# Patient Record
Sex: FEMALE | Race: WHITE | Employment: OTHER | URBAN - METROPOLITAN AREA
[De-identification: names, ages, dates, MRNs, and addresses within clinical notes are randomized per-mention and may not be internally consistent; named-entity substitution may affect disease eponyms.]

---

## 2017-07-12 ENCOUNTER — OFFICE VISIT (OUTPATIENT)
Dept: PAIN MANAGEMENT | Age: 52
End: 2017-07-12

## 2017-07-12 VITALS
HEART RATE: 62 BPM | SYSTOLIC BLOOD PRESSURE: 151 MMHG | WEIGHT: 220 LBS | HEIGHT: 65 IN | BODY MASS INDEX: 36.65 KG/M2 | DIASTOLIC BLOOD PRESSURE: 80 MMHG

## 2017-07-12 DIAGNOSIS — M47.812 CERVICAL FACET SYNDROME: ICD-10-CM

## 2017-07-12 DIAGNOSIS — M51.36 LUMBAR DEGENERATIVE DISC DISEASE: ICD-10-CM

## 2017-07-12 DIAGNOSIS — G89.4 CHRONIC PAIN SYNDROME: ICD-10-CM

## 2017-07-12 DIAGNOSIS — M54.16 LUMBAR RADICULOPATHY: Primary | ICD-10-CM

## 2017-07-12 DIAGNOSIS — M47.816 SPONDYLOSIS OF LUMBAR REGION WITHOUT MYELOPATHY OR RADICULOPATHY: ICD-10-CM

## 2017-07-12 DIAGNOSIS — M54.2 CERVICALGIA: ICD-10-CM

## 2017-07-12 NOTE — PROGRESS NOTES
[facility]  Interventional Pain Management Consultation History & Physical    PATIENT NAME:  Praneeth Hanks     YOB: 1965    DATE OF SERVICE:   7/12/2017      CHIEF COMPLAINT:  Neck Pain and Back Pain (going down right leg)      REASON FOR VISIT:   Praneeth Hanks presents to the pain clinic today for initial evaluation and to consider interventional pain management options as indicated for the type and location of the pain the patient is presenting with. HISTORY OF PRESENT ILLNESS: Patient presents for initial evaluation and consideration for interventional procedures as indicated. She is referred through [facility] system for both neck pain as well as low back pain. Regarding her neck pain, she is diagnosis cervicalgia, diagnosis code M54.2. Regarding her low back pain she is diagnosed with radiculopathy lumbar region, diagnosis code M54.16. Patient endorses chronic neck pain of 2 years duration. She denies any injury accident or trauma. She endorses insidious onset to her neck pain. She endorses chronic neck pain of 2 years duration. Pain refers to both shoulders, right shoulder more than left shoulder. She refers to radiating right arm pain two thirds of the way down the right arm. She has difficulty grasping objects with her right hand. She endorses weakness of the right arm. She denies numbness or tingling of the right arm. She endorses chronic aching of her right arm. She takes Motrin for her pain, this initially was effective, but is no longer effective for she has not had physical therapy or other measures for her neck pain. Patient also endorses chronic low back pain of long-standing duration.   Patient endorses numbness and aching of her right side of her low back down down the right leg into the right calf and down into the right toes pain is increased with lumbar axial loading including prolonged sitting standing and walking. She is tried ibuprofen with little effect. She relates to having had previous low back injections in her low back, however not for a few years. She has not had previous low back surgery. She denies current use of blood thinners. She is tried physical therapy for the low back, however this is not worked. By additional review of available medical records, patient is known to have right lateral lower leg pain and numbness and tingling down into her right foot following the S1 dermatome. She is known to have lumbar spine degenerative joint disease with multilevel neuroforaminal narrowing and central canal stenosis. She has had previous injections in the past with relief. Chronic calf pain, right-sided, times years. Intermittent cramping right lower extremity with prolonged activity. Lumbar degenerative disc disease, hip osteoarthritis, hypertension. Lumbar spine MRI is reviewed, this is dated April 8, 2016. Lumbar degenerative disc disease is noted. L5-S1 degeneration is noted. Disc space narrowing at several levels. Moderate canal stenosis L3-4 mild bilateral neuroforaminal stenosis L3-4. L4-5 intervertebral disc narrowing causing mild to moderate canal stenosis, left neuroforaminal stenosis at L4-5 level. L5-S1 disc narrowing is noted. By review of available medical records, [facility] [place] medical records dating up to May 4, 2017 are reviewed. Patient has 3 year history of right lumbar radiculopathy with right leg tingling, pain, hip pain, L4-5 mild to moderate spinal and neuroforaminal stenosis. She is failed physical therapy to her low back. We are asked to evaluate for L4-5 lumbar epidural injection. Patient has been seen by a neurologist at [facility]. Her neurologist has noted that patient has right lower extremity symptoms.   Lumbar MRI demonstrates degenerative joint disease, neuroforaminal narrowing L4-5 with moderate spinal stenosis at this level. She further has had cervical spine MRI that shows mild degenerative joint disease and C6 spinal cord T2 lesion, nonenhancing related to spinal canal ectasia. No previous neck injuries are reported. Neurologist exam and evaluation done May 1, 2017. Neurologist evaluation is that patient has lumbar radiculopathy as well as cervicalgia. Right lumbar more than left lumbar L5 radiculopathy is noted. Likely related to lumbar spinal stenosis at L4-5 level. Best option being referral to pain management for epidural steroid injection. Neck pain is from neck degenerative joint disease and would benefit from facet injections or facet procedures. Spinal canal ectasia at C6 is asymptomatic. She was recommended to raise her gabapentin to 300 mg 4 times daily if no sedation. EMGs were ordered, I do not have these results at this time. ASSESSMENT/OPTIONS: as follows. We discussed options. Patient is essentially presenting with 2 pain complaints, her neck pain as well as her low back pain. Both of these pain areas are chronic in nature. First, with regard to her low back pain, she endorses low back pain and also right leg radiating pain. Her lumbar MRI demonstrates canal moderate stenosis at L4-5. She has neuroforaminal narrowing at several levels. She has degenerative disc disease. I believe she would benefit from interlaminar lumbar epidural steroid injection at L5-S1 with IV conscious sedation. We will plan a series of 2 of these. I have discussed the risks and benefits, indications, contraindications, and side effects of intended procedure with the patient. I have used skeleton spine model to describe and discuss the procedure with the patient. I have answered all questions relating to the procedure. Patient understands the nature of the procedure and wishes to proceed. Patient has no further questions.        Second, with regard to the patient's neck pain, her signs and symptoms, as well as exam and imaging evidence suggest cervical facet syndrome. Her cervical imaging demonstrates C6 spinal cord T2 lesion nonenhancing related to spinal canal ectasia. No canal or neuroforaminal narrowing or impingement is otherwise noted no canal or neuroforaminal stenoses are noted MRI of the cervical spine dated February 8, 2017. Patient has been seen by her neurologist who is recommended both intralaminar lumbar epidural steroid injections as well as bilateral cervical facet injections for cervical facet related pain. I am in agreement with both of these assessments. After the patient has had her interlaminar lumbar epidural steroid injections, we will then turn our attention to her cervical facet mediated pain due to cervical facet syndrome. I believe she will benefit from bilateral cervical facet injections at C5-6 and C6-7 levels. We will discuss these at a later visit. MRI Results (most recent):  No results found for this or any previous visit. PAST MEDICAL HISTORY:   The patient  has no past medical history on file. PAST SURGICAL HISTORY:   The patient  has no past surgical history on file. CURRENT MEDICATIONS:   The patient currently has no medications in their medication list.    ALLERGIES:   Not on File    FAMILY HISTORY:   The patient family history is not on file. SOCIAL HISTORY:   The patient  reports that she has never smoked. She has never used smokeless tobacco. The patient  has no alcohol history on file. She also  has no drug history on file.     REVIEW OF SYSTEMS:    The patient denies fever, chills, weight loss (Constitutional), rash, itching (Skin), tinnitus, congestion (HENT), blurred vision, photophobia (Eyes), palpitations, orthopnea (Cardiovascular), hemoptysis, wheezing (Respiratory), nausea, vomiting, diarrhea (Gastrointestinal), dysuria, hematuria, urgency (Genitourinary), bowel or bladder incontinence, loss of consciousness (Neurologic), suicidal or homicidal ideation or hallucinations (Psychiatric). Denies swelling, axillary or groin masses (Lymphatic). PHYSICAL EXAM:  VS:   Visit Vitals    /80    Pulse 62    Ht 5' 5\" (1.651 m)    Wt 99.8 kg (220 lb)    LMP 07/07/2017    BMI 36.61 kg/m2     General: Well-developed and well-nourished. Body habitus consistent with recorded height and weight and the calculated BMI. Apparent distress due to neck and low back pain. Head: Normocephalic, atraumatic. Skin: Inspection of the skin reveals no rashes, lesions or infection. CV: Regular rate. No murmurs or rubs noted. No peripheral edema noted. Pulm: Respirations are even and unlabored. Extr: No clubbing, cyanosis, or edema noted. Musculoskeletal:  1. Cervical spine -slightly decreased ROM. Paraspinous tenderness bilaterally . There is no scoliosis, asymmetry, or musculoskeletal defect. 2. Thoracic spine - Full ROM. No paraspinous tenderness at any level. There is no scoliosis, asymmetry, or musculoskeletal defect. 3. Lumbar spine -slightly decreased range of motion . Paraspinous tenderness. SI joints are nontender bilaterally. There is no scoliosis, asymmetry, or musculoskeletal defect. 4. Right upper extremity - Full ROM. 5/5 muscle strength in all muscle groups. No pain or tenderness in shoulder, elbow, wrist, or hand. 5. Left upper extremity - Full ROM. 5/5 muscle strength in all muscle groups. No pain or tenderness in shoulder, elbow, wrist, or hand. 6. Right lower extremity - Full ROM. 5/5 muscle strength in all muscle groups. No pain, tenderness, or swelling in the hip, knee, ankle or foot. 7. Left lower extremity - Full ROM. 5/5 muscle strength in all muscle groups. No pain, tenderness, or swelling in the hip, knee, ankle or foot. Neurological:  1. Mental Status - Alert, awake and oriented. Speech is clear and appropriate. 2. Cranial Nerves - Extraocular muscles intact bilaterally. Cranial nerves II-XII grossly intact bilaterally. 3. Gait - antalgic   4. Reflexes - 2+ and symmetric throughout. 5. Sensation - Intact to light touch and pin prick. 6. Provocative Tests - Spurlings negative bilaterally. Straight leg raise negative bilaterally. Psychological:  1. Mood and affect - Appropriate. 2. Speech - Appropriate. 3. Though content - Appropriate. 4. Judgment - Appropriate. ASSESSMENT:      ICD-10-CM ICD-9-CM    1. Lumbar radiculopathy M54.16 724.4    2. Lumbar degenerative disc disease M51.36 722.52    3. Spondylosis of lumbar region without myelopathy or radiculopathy M47.816 721.3    4. Chronic pain syndrome G89.4 338.4    5. Cervicalgia M54.2 723.1    6. Cervical facet syndrome M12.88 723.8            PLAN:    1.    I have thoroughly discussed the risks and benefits, indications, contraindications, and side effects of any and procedures that were mentioned at today's patient visit. I have used a skeleton model to explain all procedures, as well as to provide added emphasis regarding procedures and as well for patient education purposes. I have answered all questions in great detail, and I have obtained verbal confirmation for all procedures planned with the patient. 3.    I have reviewed in great detail today the patient's MRI and other imaging studies with the patient. I have explained to the patient their condition using both actual recent and relevant images insofar as I am able to obtain actual images. I have used a skeleton model for added emphasis as well as patient education. 4.    I have advised patient to have a primary care provider continue to care for their health maintenance and general medical conditions. 5,    I have placed appropriate referrals to specialty care providers as I have deemed necessary through today's clinical consultation with the patient.   5.    I have explained to the patient that if any significant side effects, issues, problems, concerns, or perceived complications may have arisen at around the time of the patient's procedures, they should either call the pain management clinic or go to the emergency room immediately for medical provider evaluation. 6.   I have encouraged all patients to call the pain management clinic with any questions or concerns that they may have pertaining to their procedures. DISPOSITION:   The patients condition and plan were discussed at length and all questions were answered. The patient agrees with the plan. A total of 45 minutes was spent with the patient of which over half of the time was spent counseling the patient. Iraida Garcia MD 7/12/2017 2:30 PM    Note: Although these clinic notes were documented by the provider at the time of the exam, they have not been proofed and are subject to transcription variance.

## 2017-07-14 RX ORDER — MIDAZOLAM HYDROCHLORIDE 1 MG/ML
.5-6 INJECTION, SOLUTION INTRAMUSCULAR; INTRAVENOUS
Status: CANCELLED | OUTPATIENT
Start: 2017-07-19

## 2017-07-14 RX ORDER — SODIUM CHLORIDE 0.9 % (FLUSH) 0.9 %
5-10 SYRINGE (ML) INJECTION AS NEEDED
Status: CANCELLED | OUTPATIENT
Start: 2017-07-14

## 2017-07-19 ENCOUNTER — APPOINTMENT (OUTPATIENT)
Dept: GENERAL RADIOLOGY | Age: 52
End: 2017-07-19
Attending: PHYSICAL MEDICINE & REHABILITATION
Payer: OTHER GOVERNMENT

## 2017-07-19 ENCOUNTER — HOSPITAL ENCOUNTER (OUTPATIENT)
Age: 52
Setting detail: OUTPATIENT SURGERY
Discharge: HOME | End: 2017-07-19
Attending: PHYSICAL MEDICINE & REHABILITATION | Admitting: PHYSICAL MEDICINE & REHABILITATION
Payer: OTHER GOVERNMENT

## 2017-07-19 VITALS
DIASTOLIC BLOOD PRESSURE: 75 MMHG | SYSTOLIC BLOOD PRESSURE: 116 MMHG | RESPIRATION RATE: 15 BRPM | BODY MASS INDEX: 36.65 KG/M2 | TEMPERATURE: 98.5 F | HEART RATE: 65 BPM | WEIGHT: 220 LBS | HEIGHT: 65 IN | OXYGEN SATURATION: 94 %

## 2017-07-19 LAB — HCG UR QL: NEGATIVE

## 2017-07-19 PROCEDURE — 74011636320 HC RX REV CODE- 636/320

## 2017-07-19 PROCEDURE — 81025 URINE PREGNANCY TEST: CPT

## 2017-07-19 PROCEDURE — 76010000009 HC PAIN MGT 0 TO 30 MIN PROC: Performed by: PHYSICAL MEDICINE & REHABILITATION

## 2017-07-19 PROCEDURE — 74011250636 HC RX REV CODE- 250/636

## 2017-07-19 PROCEDURE — 74011000250 HC RX REV CODE- 250

## 2017-07-19 RX ORDER — ZOLMITRIPTAN 5 MG/1
5 TABLET, FILM COATED ORAL
COMMUNITY

## 2017-07-19 RX ORDER — SODIUM CHLORIDE 0.9 % (FLUSH) 0.9 %
5-10 SYRINGE (ML) INJECTION AS NEEDED
Status: DISCONTINUED | OUTPATIENT
Start: 2017-07-19 | End: 2017-07-19 | Stop reason: HOSPADM

## 2017-07-19 RX ORDER — IBUPROFEN 200 MG
800 TABLET ORAL AS NEEDED
COMMUNITY
End: 2022-04-22 | Stop reason: CLARIF

## 2017-07-19 RX ORDER — LOSARTAN POTASSIUM AND HYDROCHLOROTHIAZIDE 25; 100 MG/1; MG/1
1 TABLET ORAL DAILY
COMMUNITY
End: 2019-09-09 | Stop reason: ALTCHOICE

## 2017-07-19 RX ORDER — BUTALBITAL, ASPIRIN, AND CAFFEINE 325; 50; 40 MG/1; MG/1; MG/1
1 CAPSULE ORAL
COMMUNITY
End: 2022-04-22 | Stop reason: CLARIF

## 2017-07-19 RX ORDER — MIDAZOLAM HYDROCHLORIDE 1 MG/ML
.5-6 INJECTION, SOLUTION INTRAMUSCULAR; INTRAVENOUS
Status: DISCONTINUED | OUTPATIENT
Start: 2017-07-19 | End: 2017-07-19 | Stop reason: HOSPADM

## 2017-07-19 RX ORDER — SIMVASTATIN 20 MG/1
20 TABLET, FILM COATED ORAL
COMMUNITY

## 2017-07-19 RX ORDER — LIDOCAINE HYDROCHLORIDE 10 MG/ML
INJECTION, SOLUTION EPIDURAL; INFILTRATION; INTRACAUDAL; PERINEURAL AS NEEDED
Status: DISCONTINUED | OUTPATIENT
Start: 2017-07-19 | End: 2017-07-19 | Stop reason: HOSPADM

## 2017-07-19 RX ORDER — BETAMETHASONE SODIUM PHOSPHATE AND BETAMETHASONE ACETATE 3; 3 MG/ML; MG/ML
INJECTION, SUSPENSION INTRA-ARTICULAR; INTRALESIONAL; INTRAMUSCULAR; SOFT TISSUE AS NEEDED
Status: DISCONTINUED | OUTPATIENT
Start: 2017-07-19 | End: 2017-07-19 | Stop reason: HOSPADM

## 2017-07-19 RX ORDER — ZOLPIDEM TARTRATE 5 MG/1
10 TABLET ORAL
COMMUNITY

## 2017-07-19 RX ORDER — PROPRANOLOL HYDROCHLORIDE 120 MG/1
240 CAPSULE, EXTENDED RELEASE ORAL DAILY
COMMUNITY

## 2017-07-19 RX ORDER — GABAPENTIN 300 MG/1
300 CAPSULE ORAL 4 TIMES DAILY
COMMUNITY

## 2017-07-19 RX ORDER — FENTANYL CITRATE 50 UG/ML
INJECTION, SOLUTION INTRAMUSCULAR; INTRAVENOUS AS NEEDED
Status: DISCONTINUED | OUTPATIENT
Start: 2017-07-19 | End: 2017-07-19 | Stop reason: HOSPADM

## 2017-07-19 NOTE — DISCHARGE INSTRUCTIONS
[address] [place] [address] for Pain Management      Post Procedures Instructions    *Resume Diet and Activity as tolerated. Rest for the remainder of the day. *You may fell worse before you feel better as the numbing medications wear off before the steroids take effect if used for your procedures. *Do not use affected extremity until numbness or loss of sensation has completely resolved without assistance. *DO NOT DRIVE, operate machinery/heavey equipment for 24 hours. *DO NOT DRINK ALCOHOL for 24 hours as it may interact with the sedation if you received it and also thins your blood and may cause you to bleed. *WAIT 24 hours before starting back ANY Blood thinning medications:   (Heparin, Coumadin, Warfarin, Lovenox, Plavix, Aggrenox)    *Resume Pre-Procedure Medications as prescribed except Blood Thinners unless directed by your Physician or Cardiologist.     *Avoid Hot tubs and Heating pad for 24 hours to prevent dissipation of medications, you may shower to remove bandages and remaining prep residue on the skin. * If you develop a Headache, drink plenty of fluids including beverages with caffeine (Coffee, Mt. Dew etc.) and rest.  If the headache persists longer than 24 hoursor intensifies - Please call Center for Pain Management ([facility]) [phone]    * If you are DIABETIC, check your blood sugar three times a day for the next three days, the steroids will increase your blood sugar. If your blood sugar is greater than 400 have someone drive you to the nearest [address]. * If you experience any of the following problems, call the Center for Pain Management [phone] between 8:00 am - 4:30pm or After Hours [phone].     Shortness of breath    Fever of 101 F or higher    Nausea / Vomiting (not normal to you)    Increasing stiffness in the neck    Weakness or numbness in the arms or legs that is not resolving    Prolonged and increasing pain > than 4 days    ANYTHING OUT of the ORDINARY TO YOU    If YOU are experiencing a severe reaction / complication that you have never had before post procedure, call 911 or go to the nearest emergency room! All patients must have a  for transportation [place] regardless if you do or do not receive sedation. DISCHARGE SUMMARY from Nurse      PATIENT INSTRUCTIONS:    After Oral  or intravenous sedation, for 24 hours or while taking prescription Narcotics:  · Limit your activities  · Do not drive and operate hazardous machinery  · Do not make important personal or business decisions  · Do  not drink alcoholic beverages  · If you have not urinated within 8 hours after discharge, please contact your surgeon on call. Report the following to your surgeon:  · Excessive pain, swelling, redness or odor of or around the surgical area  · Temperature over 101  · Nausea and vomiting lasting longer than 4 hours or if unable to take medications  · Any signs of decreased circulation or nerve impairment to extremity: change in color, persistent  numbness, tingling, coldness or increase pain  · Any questions        What to do at Home:  Recommended activity: Activity as tolerated, NO DRIVING FOR 24 Hours post injection          *  Please give a list of your current medications to your Primary Care Provider. *  Please update this list whenever your medications are discontinued, doses are      changed, or new medications (including over-the-counter products) are added. *  Please carry medication information at all times in case of emergency situations. These are general instructions for a healthy lifestyle:    No smoking/ No tobacco products/ Avoid exposure to second hand smoke    Surgeon General's Warning:  Quitting smoking now greatly reduces serious risk to your health.     Obesity, smoking, and sedentary lifestyle greatly increases your risk for illness    A healthy diet, regular physical exercise & weight monitoring are important for maintaining a healthy lifestyle    You may be retaining fluid if you have a history of heart failure or if you experience any of the following symptoms:  Weight gain of 3 pounds or more overnight or 5 pounds in a week, increased swelling in our hands or feet or shortness of breath while lying flat in bed. Please call your doctor as soon as you notice any of these symptoms; do not wait until your next office visit. Recognize signs and symptoms of STROKE:    F-face looks uneven    A-arms unable to move or move unevenly    S-speech slurred or non-existent    T-time-call 911 as soon as signs and symptoms begin-DO NOT go       Back to bed or wait to see if you get better-TIME IS BRAIN.

## 2017-07-19 NOTE — INTERVAL H&P NOTE
H&P Update:  Araceli Torres was seen and examined. History and physical has been reviewed. The patient has been examined.  There have been no significant clinical changes since the completion of the originally dated History and Physical.    Signed By: Jameel Myles MD     July 19, 2017 8:32 AM

## 2017-07-19 NOTE — PROCEDURES
[facility]    LUMBAR EPIDURAL STEROID INJECTION  PROCEDURE REPORT      PATIENT:  Cyndi Sherman  YOB: 1965  DATE OF SERVICE:  7/19/2017  [facility]    PRE-PROCEDURE DIAGNOSIS:  See Above    POST-PROCEDURE DIAGNOSIS:  See Above                PROCEDURE:    1. Lumbar Interlaminar epidural steroid injection, L5-S1 (91934)  2. Fluoroscopic needle guidance (spinal) (46266)        3. Supervision of moderate sedation (85070)    ANESTHESIA:  Local with moderate IV sedation. See Medication Administration Record for specific medications and dosage. COMPLICATIONS: None. PHYSICIAN:  Saul Caal MD    PRE-PROCEDURE NOTE:  Pre-procedural assessment of the patient was performed including a limited history and physical examination. The details of the procedure were discussed with the patient, including the risks, benefits and alternative options and an informed consent was obtained. The patients NPO status, if necessary for the specific procedure and/or administration of moderate intravenous sedation, if utilized, and availability of a responsible adult to escort the patient following the procedure were confirmed. PROCEDURE NOTE:  The patient was brought to the procedure suite and positioned on the fluoroscopy table in the prone position. Physiologic monitors were applied and supplemental oxygen was administered via nasal cannula. The skin was prepped in the standard surgical fashion and sterile drapes were applied over the procedure site. Please refer to the Flowsheet for documentation of the patients vital signs and the Medication Administration Record for any oral and/or intravenous sedation administered prior to or during the procedure. The above-listed interlaminar space was identified and the skin and subcutaneous tissues were infiltrated with 1% Lidocaine.   Under anterior-posterior fluoroscopic guidance an 18g, 3.5-inch Tuohy epidural needle was advanced along the previously identified interlaminar space. The fluoroscope was then turned lateral view and a loss of resistance syringe was attached to the needle containing preservative free normal saline. Under lateral flouroscopic guidance, the needle was then advanced through the ligamentum flavum, entering the epidural space with a clear and crisp loss of resistance. The needle was not advanced beyond the interlaminar line at any time during this process. Aspiration was negative for blood or CSF. Additional confirmation was made with the injection of 1 mL of a nonionic water-soluble radiographic contrast medium (Isovue-M 200). Following this, 4 mL of a solution comprised of 2 mL of lidocaine 1% and 2mL betamethasone (6mg/ml) was injected slowly through the epidural needle. The needle was cleared of steroid solution and removed. The area was thoroughly cleaned and sterile bandages applied as necessary. The patient tolerated the procedure well and vital signs remained stable throughout the procedure. POST-PROCEDURE COURSE:   The patient was escorted from the procedure suite in satisfactory condition and recovered per facility protocol based on the type of procedure performed and/or the sedation utilized. The patient did not experience any adverse events and remained hemodynamically stable during the post-procedure period. DISCHARGE NOTE:  Upon discharge, the patient was able to tolerate fluids and was in no acute distress. The patient was oriented to person, place and time and vital signs were stable. Appropriate post-procedure instructions were provided and explained to the patient in detail and all questions were answered.     Mikaela Hendrix MD 7/19/2017 10:52 AM

## 2017-07-28 ENCOUNTER — HOSPITAL ENCOUNTER (OUTPATIENT)
Dept: LAB | Age: 52
Discharge: HOME | End: 2017-07-28
Payer: OTHER GOVERNMENT

## 2017-07-28 DIAGNOSIS — Z01.818 PRE-OP EVALUATION: ICD-10-CM

## 2017-07-28 LAB
ANION GAP BLD CALC-SCNC: 7 MMOL/L (ref 3–18)
ATRIAL RATE: 64 BPM
BUN SERPL-MCNC: 13 MG/DL (ref 7–18)
BUN/CREAT SERPL: 15 (ref 12–20)
CALCIUM SERPL-MCNC: 9 MG/DL (ref 8.5–10.1)
CALCULATED T AXIS, ECG11: 24 DEGREES
CHLORIDE SERPL-SCNC: 103 MMOL/L (ref 100–108)
CO2 SERPL-SCNC: 28 MMOL/L (ref 21–32)
CREAT SERPL-MCNC: 0.87 MG/DL (ref 0.6–1.3)
DIAGNOSIS, 93000: NORMAL
GLUCOSE SERPL-MCNC: 84 MG/DL (ref 74–99)
P-R INTERVAL, ECG05: 168 MS
POTASSIUM SERPL-SCNC: 4.1 MMOL/L (ref 3.5–5.5)
Q-T INTERVAL, ECG07: 410 MS
QRS DURATION, ECG06: 80 MS
QTC CALCULATION (BEZET), ECG08: 422 MS
SODIUM SERPL-SCNC: 138 MMOL/L (ref 136–145)
VENTRICULAR RATE, ECG03: 64 BPM

## 2017-07-28 PROCEDURE — 93005 ELECTROCARDIOGRAM TRACING: CPT

## 2017-07-28 RX ORDER — SODIUM CHLORIDE 0.9 % (FLUSH) 0.9 %
5-10 SYRINGE (ML) INJECTION AS NEEDED
Status: CANCELLED | OUTPATIENT
Start: 2017-08-02

## 2017-07-28 RX ORDER — MIDAZOLAM HYDROCHLORIDE 1 MG/ML
.5-6 INJECTION, SOLUTION INTRAMUSCULAR; INTRAVENOUS
Status: CANCELLED | OUTPATIENT
Start: 2017-08-02

## 2017-08-02 ENCOUNTER — HOSPITAL ENCOUNTER (OUTPATIENT)
Age: 52
Setting detail: OUTPATIENT SURGERY
Discharge: HOME | End: 2017-08-02
Attending: PHYSICAL MEDICINE & REHABILITATION | Admitting: PHYSICAL MEDICINE & REHABILITATION
Payer: OTHER GOVERNMENT

## 2017-08-02 ENCOUNTER — APPOINTMENT (OUTPATIENT)
Dept: GENERAL RADIOLOGY | Age: 52
End: 2017-08-02
Attending: PHYSICAL MEDICINE & REHABILITATION
Payer: OTHER GOVERNMENT

## 2017-08-02 VITALS
HEIGHT: 65 IN | BODY MASS INDEX: 36.65 KG/M2 | TEMPERATURE: 98.5 F | OXYGEN SATURATION: 92 % | DIASTOLIC BLOOD PRESSURE: 68 MMHG | SYSTOLIC BLOOD PRESSURE: 101 MMHG | WEIGHT: 220 LBS | HEART RATE: 60 BPM | RESPIRATION RATE: 15 BRPM

## 2017-08-02 PROCEDURE — 76010000009 HC PAIN MGT 0 TO 30 MIN PROC: Performed by: PHYSICAL MEDICINE & REHABILITATION

## 2017-08-02 PROCEDURE — 74011636320 HC RX REV CODE- 636/320

## 2017-08-02 PROCEDURE — 74011000250 HC RX REV CODE- 250

## 2017-08-02 PROCEDURE — 74011250636 HC RX REV CODE- 250/636

## 2017-08-02 RX ORDER — SODIUM CHLORIDE 0.9 % (FLUSH) 0.9 %
5-10 SYRINGE (ML) INJECTION AS NEEDED
Status: DISCONTINUED | OUTPATIENT
Start: 2017-08-02 | End: 2017-08-02 | Stop reason: HOSPADM

## 2017-08-02 RX ORDER — FENTANYL CITRATE 50 UG/ML
25-400 INJECTION, SOLUTION INTRAMUSCULAR; INTRAVENOUS
Status: DISCONTINUED | OUTPATIENT
Start: 2017-08-02 | End: 2017-08-02 | Stop reason: HOSPADM

## 2017-08-02 RX ORDER — LIDOCAINE HYDROCHLORIDE 10 MG/ML
INJECTION, SOLUTION EPIDURAL; INFILTRATION; INTRACAUDAL; PERINEURAL AS NEEDED
Status: DISCONTINUED | OUTPATIENT
Start: 2017-08-02 | End: 2017-08-02 | Stop reason: HOSPADM

## 2017-08-02 RX ORDER — MIDAZOLAM HYDROCHLORIDE 1 MG/ML
.5-6 INJECTION, SOLUTION INTRAMUSCULAR; INTRAVENOUS
Status: DISCONTINUED | OUTPATIENT
Start: 2017-08-02 | End: 2017-08-02 | Stop reason: HOSPADM

## 2017-08-02 RX ORDER — BETAMETHASONE SODIUM PHOSPHATE AND BETAMETHASONE ACETATE 3; 3 MG/ML; MG/ML
INJECTION, SUSPENSION INTRA-ARTICULAR; INTRALESIONAL; INTRAMUSCULAR; SOFT TISSUE AS NEEDED
Status: DISCONTINUED | OUTPATIENT
Start: 2017-08-02 | End: 2017-08-02 | Stop reason: HOSPADM

## 2017-08-02 NOTE — DISCHARGE INSTRUCTIONS
[facility]      Post Procedures Instructions    *Resume Diet and Activity as tolerated. Rest for the remainder of the day. *You may fell worse before you feel better as the numbing medications wear off before the steroids take effect if used for your procedures. *Do not use affected extremity until numbness or loss of sensation has completely resolved without assistance. *DO NOT DRIVE, operate machinery/heavey equipment for 24 hours. *DO NOT DRINK ALCOHOL for 24 hours as it may interact with the sedation if you received it and also thins your blood and may cause you to bleed. *WAIT 24 hours before starting back ANY Blood thinning medications:   (Heparin, Coumadin, Warfarin, Lovenox, Plavix, Aggrenox)    *Resume Pre-Procedure Medications as prescribed except Blood Thinners unless directed by your Physician or Cardiologist.     *Avoid Hot tubs and Heating pad for 24 hours to prevent dissipation of medications, you may shower to remove bandages and remaining prep residue on the skin. * If you develop a Headache, drink plenty of fluids including beverages with caffeine (Coffee, Mt. Dew etc.) and rest.  If the headache persists longer than 24 hoursor intensifies - Please call [facility] ([facility]) [phone]    * If you are DIABETIC, check your blood sugar three times a day for the next three days, the steroids will increase your blood sugar. If your blood sugar is greater than 400 have someone drive you to the nearest [address]. * If you experience any of the following problems, call the [facility] [phone] between 8:00 am - 4:30pm or After Hours [phone].     Shortness of breath    Fever of 101 F or higher    Nausea / Vomiting (not normal to you)    Increasing stiffness in the neck    Weakness or numbness in the arms or legs that is not resolving    Prolonged and increasing pain > than 4 days    ANYTHING OUT of the ORDINARY TO YOU    If YOU are experiencing a severe reaction / complication that you have never had before post procedure, call 911 or go to the nearest emergency room! All patients must have a  for transportation [place] regardless if you do or do not receive sedation. DISCHARGE SUMMARY from Nurse      PATIENT INSTRUCTIONS:    After Oral  or intravenous sedation, for 24 hours or while taking prescription Narcotics:  · Limit your activities  · Do not drive and operate hazardous machinery  · Do not make important personal or business decisions  · Do  not drink alcoholic beverages  · If you have not urinated within 8 hours after discharge, please contact your surgeon on call. Report the following to your surgeon:  · Excessive pain, swelling, redness or odor of or around the surgical area  · Temperature over 101  · Nausea and vomiting lasting longer than 4 hours or if unable to take medications  · Any signs of decreased circulation or nerve impairment to extremity: change in color, persistent  numbness, tingling, coldness or increase pain  · Any questions        What to do at Home:  Recommended activity: Activity as tolerated, NO DRIVING FOR 24 Hours post injection          *  Please give a list of your current medications to your Primary Care Provider. *  Please update this list whenever your medications are discontinued, doses are      changed, or new medications (including over-the-counter products) are added. *  Please carry medication information at all times in case of emergency situations. These are general instructions for a healthy lifestyle:    No smoking/ No tobacco products/ Avoid exposure to second hand smoke    Surgeon General's Warning:  Quitting smoking now greatly reduces serious risk to your health.     Obesity, smoking, and sedentary lifestyle greatly increases your risk for illness    A healthy diet, regular physical exercise & weight monitoring are important for maintaining a healthy lifestyle    You may be retaining fluid if you have a history of heart failure or if you experience any of the following symptoms:  Weight gain of 3 pounds or more overnight or 5 pounds in a week, increased swelling in our hands or feet or shortness of breath while lying flat in bed. Please call your doctor as soon as you notice any of these symptoms; do not wait until your next office visit. Recognize signs and symptoms of STROKE:    F-face looks uneven    A-arms unable to move or move unevenly    S-speech slurred or non-existent    T-time-call 911 as soon as signs and symptoms begin-DO NOT go       Back to bed or wait to see if you get better-TIME IS BRAIN.

## 2017-08-02 NOTE — INTERVAL H&P NOTE
H&P Update:  Maxim Strange was seen and examined. History and physical has been reviewed. The patient has been examined.  There have been no significant clinical changes since the completion of the originally dated History and Physical.    Signed By: Brody Lombardo MD     August 2, 2017 7:57 AM

## 2017-08-02 NOTE — PROCEDURES
[facility]    LUMBAR EPIDURAL STEROID INJECTION  PROCEDURE REPORT      PATIENT:  Jalil Ureña  YOB: 1965  DATE OF SERVICE:  8/2/2017  SITE:  [facility]    PRE-PROCEDURE DIAGNOSIS:  See Above    POST-PROCEDURE DIAGNOSIS:  See Above                PROCEDURE:    1. Lumbar Interlaminar epidural steroid injection, L5-S1 (00592)  2. Fluoroscopic needle guidance (spinal) (79752)        3. Supervision of moderate sedation (21155)    ANESTHESIA:  Local with moderate IV sedation. See Medication Administration Record for specific medications and dosage. COMPLICATIONS: None. PHYSICIAN:  Wanda Torres MD    PRE-PROCEDURE NOTE:  Pre-procedural assessment of the patient was performed including a limited history and physical examination. The details of the procedure were discussed with the patient, including the risks, benefits and alternative options and an informed consent was obtained. The patients NPO status, if necessary for the specific procedure and/or administration of moderate intravenous sedation, if utilized, and availability of a responsible adult to escort the patient following the procedure were confirmed. PROCEDURE NOTE:  The patient was brought to the procedure suite and positioned on the fluoroscopy table in the prone position. Physiologic monitors were applied and supplemental oxygen was administered via nasal cannula. The skin was prepped in the standard surgical fashion and sterile drapes were applied over the procedure site. Please refer to the Flowsheet for documentation of the patients vital signs and the Medication Administration Record for any oral and/or intravenous sedation administered prior to or during the procedure. The above-listed interlaminar space was identified and the skin and subcutaneous tissues were infiltrated with 1% Lidocaine.   Under anterior-posterior fluoroscopic guidance an 18g, 3.5-inch Tuohy epidural needle was advanced along the previously identified interlaminar space. The fluoroscope was then turned lateral view and a loss of resistance syringe was attached to the needle containing preservative free normal saline. Under lateral flouroscopic guidance, the needle was then advanced through the ligamentum flavum, entering the epidural space with a clear and crisp loss of resistance. The needle was not advanced beyond the interlaminar line at any time during this process. Aspiration was negative for blood or CSF. Additional confirmation was made with the injection of 1 mL of a nonionic water-soluble radiographic contrast medium (Isovue-M 200). Following this, 4 mL of a solution comprised of 2 mL of lidocaine 1% and 2mL betamethasone (6mg/ml) was injected slowly through the epidural needle. The needle was cleared of steroid solution and removed. The area was thoroughly cleaned and sterile bandages applied as necessary. The patient tolerated the procedure well and vital signs remained stable throughout the procedure. POST-PROCEDURE COURSE:   The patient was escorted from the procedure suite in satisfactory condition and recovered per facility protocol based on the type of procedure performed and/or the sedation utilized. The patient did not experience any adverse events and remained hemodynamically stable during the post-procedure period. DISCHARGE NOTE:  Upon discharge, the patient was able to tolerate fluids and was in no acute distress. The patient was oriented to person, place and time and vital signs were stable. Appropriate post-procedure instructions were provided and explained to the patient in detail and all questions were answered.     Bryce Rosales MD 8/2/2017 8:27 AM

## 2019-09-09 ENCOUNTER — OFFICE VISIT (OUTPATIENT)
Dept: ORTHOPEDIC SURGERY | Age: 54
End: 2019-09-09

## 2019-09-09 VITALS
BODY MASS INDEX: 37.49 KG/M2 | WEIGHT: 225 LBS | SYSTOLIC BLOOD PRESSURE: 150 MMHG | HEIGHT: 65 IN | DIASTOLIC BLOOD PRESSURE: 87 MMHG | HEART RATE: 59 BPM

## 2019-09-09 DIAGNOSIS — M51.36 BULGE OF LUMBAR DISC WITHOUT MYELOPATHY: ICD-10-CM

## 2019-09-09 DIAGNOSIS — M79.18 CHRONIC MUSCULOSKELETAL PAIN: ICD-10-CM

## 2019-09-09 DIAGNOSIS — M54.16 LUMBAR RADICULOPATHY: Primary | ICD-10-CM

## 2019-09-09 DIAGNOSIS — R93.7 ABNORMAL MRI, CERVICAL SPINE: ICD-10-CM

## 2019-09-09 DIAGNOSIS — M62.838 MUSCLE SPASM: ICD-10-CM

## 2019-09-09 DIAGNOSIS — E66.01 SEVERE OBESITY (HCC): ICD-10-CM

## 2019-09-09 DIAGNOSIS — G89.29 CHRONIC MUSCULOSKELETAL PAIN: ICD-10-CM

## 2019-09-09 DIAGNOSIS — M47.816 LUMBAR FACET ARTHROPATHY: ICD-10-CM

## 2019-09-09 RX ORDER — HYDROCHLOROTHIAZIDE 50 MG/1
50 TABLET ORAL DAILY
COMMUNITY

## 2019-09-09 RX ORDER — DICLOFENAC SODIUM 10 MG/G
2 GEL TOPICAL 4 TIMES DAILY
COMMUNITY

## 2019-09-09 RX ORDER — AMLODIPINE BESYLATE 5 MG/1
5 TABLET ORAL DAILY
COMMUNITY

## 2019-09-09 RX ORDER — DULOXETIN HYDROCHLORIDE 30 MG/1
CAPSULE, DELAYED RELEASE ORAL
Qty: 60 CAP | Refills: 1 | Status: SHIPPED | OUTPATIENT
Start: 2019-09-09 | End: 2022-04-22 | Stop reason: CLARIF

## 2019-09-09 RX ORDER — LOSARTAN POTASSIUM 100 MG/1
100 TABLET ORAL DAILY
COMMUNITY
End: 2022-04-22 | Stop reason: CLARIF

## 2019-09-09 RX ORDER — FLUTICASONE PROPIONATE 50 MCG
2 SPRAY, SUSPENSION (ML) NASAL DAILY
COMMUNITY

## 2019-09-09 NOTE — PROGRESS NOTES
[facility]  [address] [place], [facility] [address]  Phone: [phone]  Fax: [phone]    NEW PATIENT  Pt's YOB: 1965    ASSESSMENT   Diagnoses and all orders for this visit:    1. Lumbar radiculopathy    2. Bulge of lumbar disc without myelopathy    3. Chronic musculoskeletal pain  -     DULoxetine (CYMBALTA) 30 mg capsule; Take 1 cap by mouth in the evening with dinner for 1 week, then increase to 2 as directed. 4. Abnormal MRI, cervical spine    5. Lumbar facet arthropathy    6. Muscle spasm    7. Severe obesity ([address]         IMPRESSION AND PLAN:  Reji Mendoza is a 62 y.o. right hand dominant female with history of neck and right leg pain. She complains of pain in the neck and aching, throbbing, burning pain radiating down the right leg to the toes x 8-10 years without inciting injuries. Pt last attended physical therapy over 1 year ago and had to discontinue sessions due to severe pain with lumbar traction. She uses ibuprofen, Voltaren 1% gel, and Neurontin 300 mg 1 tab up to 4 x daily as needed with minimal relief. 1) Pt was given information on cervical and lumbar arthritis exercises. 2) Discussed treatment options with the patient including medication, physical therapy with dry needling, and steroid injections. 3) She will taper off the Neurontin 300 mg as tolerated. Discussed restarting that Neurontin and increase to 300 mg 2 tabs TID if needed. 4) She was prescribed Cymbalta 30 mg 1-2 tabs in the evening with daily with dinner as directed. 5) I encouraged the patient to exercise regularly, 30 minutes a day, 5 days a week. 6) She will bring her cervical and lumbar MRI discs to her next office visit. 7) Ms. Ernie Urias has a reminder for a \"due or due soon\" health maintenance. I have asked that she contact her primary care provider, Other, MD Mary, for follow-up on this health maintenance.   8)  demonstrated consistency with prescribing. 9) Weight loss recommended. 10) Repeat cervical MRI - report recommends in 1 year (June 2020)  Follow-up and Dispositions    · Return in about 6 weeks (around 10/21/2019) for Medication follow up, Diagnostic Test follow up. HISTORY OF PRESENT ILLNESS:  Akbar Mejias is a 62 y.o. right hand dominant female with history of neck and right leg pain x 8-10 years. Pt presents to the office today as a new patient. She complains of pain in the neck and aching, throbbing, burning pain radiating down the right leg to the toes x 8-10 years without inciting injuries. Pt admits to burning pain and numbness in the toes on the right. She notes increased right leg pain when sitting, standing, walking, or performing any activity for longer than 20-30 minutes. Pt denies any left-sided symptoms or lower back pain at this time. She states that her symptoms started in the right calf and have gradually progressed. Pt denies any loss of bowel/bladder control or issues with balance. She previously had an EMG about 1 year ago at [facility] and according to the patient, this demonstrated normal findings. Pt last attended physical therapy over 1 year ago and had to discontinue sessions due to severe pain with lumbar traction. She denies any previous cervical traction or dry needling. Pt reports no relief with previous hip and lumbar injections. She uses ibuprofen and Voltaren 1% gel with minimal improvement. Pt also takes Neurontin 300 mg 1 tab up to 4 x daily as needed without relief. She reports minimal relief with Topamax and Lyrica in the past. Pt admits to a history of migraine headaches and is on Zomig. She took Wellbutrin in the past but denies ever taking Cymbalta. Pt notes that she performs stretches as needed but reports a inconsistent HEP. Pt at this time desires to proceed with medication evaluation.     Pain Scale: /10     PCP: Other, MD Mary    Past Medical History: Diagnosis Date    Hypertension     Sleep apnea         Social History     Socioeconomic History    Marital status:      Spouse name: Not on file    Number of children: Not on file    Years of education: Not on file    Highest education level: Not on file   Occupational History    Not on file   Social Needs    Financial resource strain: Not on file    Food insecurity:     Worry: Not on file     Inability: Not on file    Transportation needs:     Medical: Not on file     Non-medical: Not on file   Tobacco Use    Smoking status: Never Smoker    Smokeless tobacco: Never Used   Substance and Sexual Activity    Alcohol use: Not Currently    Drug use: Not on file    Sexual activity: Not on file   Lifestyle    Physical activity:     Days per week: Not on file     Minutes per session: Not on file    Stress: Not on file   Relationships    Social connections:     Talks on phone: Not on file     Gets together: Not on file     Attends Denominational service: Not on file     Active member of club or organization: Not on file     Attends meetings of clubs or organizations: Not on file     Relationship status: Not on file    Intimate partner violence:     Fear of current or ex partner: Not on file     Emotionally abused: Not on file     Physically abused: Not on file     Forced sexual activity: Not on file   Other Topics Concern     Service Not Asked    Blood Transfusions Not Asked    Caffeine Concern Not Asked    Occupational Exposure Not Asked   [address] Hazards Not Asked    Sleep Concern Not Asked    Stress Concern Not Asked    Weight Concern Not Asked    Special Diet Not Asked    Back Care Not Asked    Exercise Not Asked    Bike Helmet Not Asked    Seat Belt Not Asked    Self-Exams Not Asked   Social History Narrative    Not on file       Current Outpatient Medications   Medication Sig Dispense Refill    diclofenac (VOLTAREN) 1 % gel Apply  to affected area four (4) times daily.  hydroCHLOROthiazide (HYDRODIURIL) 50 mg tablet Take 25 mg by mouth daily.  losartan (COZAAR) 100 mg tablet Take 100 mg by mouth daily.  amLODIPine (NORVASC) 5 mg tablet Take 5 mg by mouth daily.  fluticasone propionate (FLONASE ALLERGY RELIEF) 50 mcg/actuation nasal spray 2 Sprays by Both Nostrils route daily.  DULoxetine (CYMBALTA) 30 mg capsule Take 1 cap by mouth in the evening with dinner for 1 week, then increase to 2 as directed. 60 Cap 1    ZOLMitriptan (ZOMIG) 5 mg tablet Take 5 mg by mouth as needed for Migraine.  ibuprofen (MOTRIN IB) 200 mg tablet Take 800 mg by mouth as needed for Pain.  simvastatin (ZOCOR) 20 mg tablet Take 20 mg by mouth daily.  zolpidem (AMBIEN) 5 mg tablet Take  by mouth nightly as needed for Sleep.  propranolol LA (INDERAL LA) 120 mg SR capsule Take 240 mg by mouth daily.  gabapentin (NEURONTIN) 300 mg capsule Take 300 mg by mouth two (2) times a day.  butalbital-aspirin-caffeine (FIORINAL) capsule Take 1 Cap by mouth every four (4) hours as needed for Pain. Allergies   Allergen Reactions    Oats Nausea Only       REVIEW OF SYSTEMS    Constitutional: Negative for fever, chills, or weight change. Respiratory: Negative for cough or shortness of breath. Cardiovascular: Negative for chest pain or palpitations. Gastrointestinal: Negative for acid reflux, change in bowel habits, or constipation. Genitourinary: Negative for dysuria and flank pain. Musculoskeletal: Positive for cervical and right leg pain. Skin: Negative for rash. Neurological: Negative for dizziness. Positive for numbness in the right leg and history of migraine headaches  Endo/Heme/Allergies: Negative for increased bruising. Psychiatric/Behavioral: Negative for difficulty with sleep.     PHYSICAL EXAMINATION  Visit Vitals  /87   Pulse (!) 59   Ht 5' 5\" (1.651 m)   Wt 225 lb (102.1 kg)   BMI 37.44 kg/m²       Constitutional: Awake, alert, and in no acute distress. HEENT: Normocephalic. Atraumatic. Oropharynx is moist and clear. PERRL. EOMI. Sclerae are nonicteric  Cardiovascular: Regular rate and rhythm  Lungs: Clear to auscultation bilaterally  Abdomen: Soft and nontender. Bowel sounds are present  Neurological: 1+ symmetrical DTRs in the upper extremities. 1+ symmetrical DTRs in the lower extremities. Sensation to light touch is intact. Negative Arteaga's sign bilaterally. Skin: warm, dry, and intact. Musculoskeletal: Tight across the upper trapezius bilaterally. Decreased range of motion with side to side cervical flexion. No tenderness to palpation in the lower lumbar region. No pain with extension, axial loading, or forward flexion. No pain with internal or external rotation of her hips. Negative straight leg raise bilaterally. No pain with heel or toe walking. No difficulty with the single leg stand bilaterally. Biceps  Triceps Deltoids Wrist Ext Wrist Flex Hand Intrin   Right +4/5 +4/5 +4/5 +4/5 +4/5 +4/5   Left +4/5 +4/5 +4/5 +4/5 +4/5 +4/5      Hip Flex  Quads Hamstrings Ankle DF EHL Ankle PF   Right +4/5 +4/5 +4/5 +4/5 +4/5 +4/5   Left +4/5 +4/5 +4/5 +4/5 +4/5 +4/5     IMAGING:    Lumbar spine MRI from 06/05/2019 was personally reviewed with the patient and demonstrated:  IMPRESSION:  1. Multilevel disc degenerative changes with multilevel intraspinal or intraforaminal disc encroachments, specifically, T11-T12, L1-L2, L2-L3, L3-L4, and L4-L5 levels. 2. No significant central spinal canal narrowing but there are noted subarticular recess narrowing L3-4 right and L4-5 left. Subarticular recess narrowing may affect the pre-exiting intrarecess nerves which in this case would represent right L4 and left L5 nerves. 3. Neural foraminal narrowing moderate or worse: L3-L4 right and L4-L5 left. No findings if intraforminal nerve compressions.     Cervical spine MRI from 06/30/2019 was personally reviewed with the patient and demonstrated:  IMPRESSION:  1. Cystic lesion in the region of the central canal of the spinal cord at the C6 vertebral body level. Possibilities include a syrinx, a complex cyst and a cystic myelomalacia. A non enhancing cystic neoplasm although less likely cannot be entirely excluded. Recommend a follow-up in approximately 1 a year. 2. Mild multilevel degenerative changes and disc disease most pronounced at C5-C6. No significant spinal canal or neural foraminal narrowing. Written by Rita Torres, as dictated by Mavis Pedroza MD.  I, Dr. Mavis Pedroza confirm that all documentation is accurate.

## 2019-09-09 NOTE — PATIENT INSTRUCTIONS
Department of Anesthesiology  Preprocedure Note       Name:  Johnny Goltz   Age:  37 y.o.  :  1974                                          MRN:  [id number]         Date:  10/2/2017      Surgeon: Jaciel Wu):  Rangel Stallworth DO    Procedure: Procedure(s):  XI ROBOTIC GASTRECTOMY SLEEVE LAPAROSCOPIC, LIVER BIOPSY, EGD - GI UNIT SCHEDULED. Medications prior to admission:   Prior to Admission medications    Medication Sig Start Date End Date Taking?  Authorizing Provider   lisinopril (PRINIVIL;ZESTRIL) 20 MG tablet Take 1 tablet by mouth daily 17  Yes Jac Raza CNP   pantoprazole (PROTONIX) 40 MG tablet take 1 tablet by mouth once daily 17  Yes Rangel Stallworth DO   Acetaminophen (TYLENOL ARTHRITIS PAIN PO) Take 2 tablets by mouth 3 times daily    Yes Historical Provider, MD   docusate sodium (COLACE) 100 MG capsule Take 1 capsule by mouth 2 times daily as needed for Constipation 17 Yes Jac Raza CNP   Thiamine HCl (VITAMIN B-1 PO) Take by mouth daily    Yes Historical Provider, MD   Calcium Citrate-Vitamin D (CALCIUM + D PO) Take 1 tablet by mouth 2 times daily    Yes Historical Provider, MD   Multiple Vitamin (MULTI VITAMIN PO) Take 1 capsule by mouth daily    Yes Historical Provider, MD   cyclobenzaprine (FLEXERIL) 10 MG tablet Take 1 tablet by mouth 2 times daily as needed for Muscle spasms 17  Jac Raza CNP   Fish Oil-Cholecalciferol (FISH OIL + D3 PO) Take by mouth daily     Historical Provider, MD       Current medications:    Current Facility-Administered Medications   Medication Dose Route Frequency Provider Last Rate Last Dose    ampicillin-sulbactam (UNASYN) 3 g ivpb minibag  3 g Intravenous Once Rangel Stallworth DO        lactated ringers infusion 1,000 mL  1,000 mL Intravenous Continuous Franklin Solo MD 50 mL/hr at 10/02/17 0625 1,000 mL at 10/02/17 3960       Allergies:  No Known Allergies    Problem List:    Patient Active Neck Arthritis: Exercises  Introduction  Here are some examples of exercises for you to try. The exercises may be suggested for a condition or for rehabilitation. Start each exercise slowly. Ease off the exercises if you start to have pain. You will be told when to start these exercises and which ones will work best for you. How to do the exercises  Neck stretches to the side    1. This stretch works best if you keep your shoulder down as you lean away from it. To help you remember to do this, start by relaxing your shoulders and lightly holding on to your thighs or your chair. 2. Tilt your head toward your shoulder and hold for 15 to 30 seconds. Let the weight of your head stretch your muscles. 3. Repeat 2 to 4 times toward each shoulder. Chin tuck    1. Lie on the floor with a rolled-up towel under your neck. Your head should be touching the floor. 2. Slowly bring your chin toward your chest.  3. Hold for a count of 6, and then relax for up to 10 seconds. 4. Repeat 8 to 12 times. Active cervical rotation    1. Sit in a firm chair, or stand up straight. 2. Keeping your chin level, turn your head to the right, and hold for 15 to 30 seconds. 3. Turn your head to the left and hold for 15 to 30 seconds. 4. Repeat 2 to 4 times to each side. Shoulder blade squeeze    1. While standing, squeeze your shoulder blades together. 2. Do not raise your shoulders up as you are squeezing. 3. Hold for 6 seconds. 4. Repeat 8 to 12 times. Shoulder rolls    1. Sit comfortably with your feet shoulder-width apart. You can also do this exercise standing up. 2. Roll your shoulders up, then back, and then down in a smooth, circular motion. 3. Repeat 2 to 4 times. Follow-up care is a key part of your treatment and safety. Be sure to make and go to all appointments, and call your doctor if you are having problems.  It's also a good idea to know your test results and keep a list of the medicines you take.  Where can you learn more? Go to http://britton-agusto.info/. Enter E516 in the search box to learn more about \"Neck Arthritis: Exercises. \"  Current as of: September 20, 2018  Content Version: 12.1  © 2712-4067 [employer]. Care instructions adapted under license by [employer] (which disclaims liability or warranty for this information). If you have questions about a medical condition or this instruction, always ask your healthcare professional. [address] any warranty or liability for your use of this information. Low Back Arthritis: Exercises  Introduction  Here are some examples of typical rehabilitation exercises for your condition. Start each exercise slowly. Ease off the exercise if you start to have pain. Your doctor or physical therapist will tell you when you can start these exercises and which ones will work best for you. When you are not being active, find a comfortable position for rest. Some people are comfortable on the floor or a medium-firm bed with a small pillow under their head and another under their knees. Some people prefer to lie on their side with a pillow between their knees. Don't stay in one position for too long. Take short walks (10 to 20 minutes) every 2 to 3 hours. Avoid slopes, hills, and stairs until you feel better. Walk only distances you can manage without pain, especially leg pain. How to do the exercises  Pelvic tilt    4. Lie on your back with your knees bent. 5. \"Brace\" your stomach--tighten your muscles by pulling in and imagining your belly button moving toward your spine. 6. Press your lower back into the floor. You should feel your hips and pelvis rock back. 7. Hold for 6 seconds while breathing smoothly. 8. Relax and allow your pelvis and hips to rock forward. 9. Repeat 8 to 12 times. Back stretches    5. Get down on your hands and knees on the floor.   6. Relax your head and Date    WBC 5.2 09/18/2017    RBC 5.20 09/18/2017    HGB 14.1 09/18/2017    HCT 41.9 09/18/2017    MCV 80.6 09/18/2017    RDW 13.7 09/18/2017     09/18/2017       CMP:   Lab Results   Component Value Date     09/18/2017    K 4.0 09/18/2017     09/18/2017    CO2 26 09/18/2017    BUN 14 09/18/2017    CREATININE 0.82 09/18/2017    GFRAA >60 09/18/2017    LABGLOM >60 09/18/2017    GLUCOSE 112 09/18/2017    PROT 6.9 02/27/2017    CALCIUM 8.7 09/18/2017    BILITOT 0.45 02/27/2017    ALKPHOS 52 02/27/2017    AST 26 02/27/2017    ALT 46 02/27/2017       POC Tests:   Recent Labs      10/02/17   1214   POCK  4.0       Coags:   Lab Results   Component Value Date    PROTIME 9.8 09/18/2017    INR 0.9 09/18/2017    APTT 26.0 09/18/2017       HCG (If Applicable): No results found for: PREGTESTUR, PREGSERUM, HCG, HCGQUANT     ABGs: No results found for: PHART, PO2ART, QPJ5ILW, SWS7OQK, BEART, K2INDXUE     Type & Screen (If Applicable):  No results found for: [facility]    Anesthesia Evaluation  Patient summary reviewed and Nursing notes reviewed   history of anesthetic complications: PONV  Airway: Mallampati: II  TM distance: >3 FB   Neck ROM: full  Mouth opening: > = 3 FB Dental:          Pulmonary:normal exam    (+) sleep apnea:         Cardiovascular:  Exercise tolerance: good (>4 METS),   (+) hypertension:, hyperlipidemia    (-) CAD, CABG/stent, dysrhythmias,  angina,  CHF and orthopnea          Beta Blocker:  Not on Beta Blocker   Neuro/Psych:   {neg ROS     GI/Hepatic/Renal:   (+) PUD,      (-) liver disease     Endo/Other:    (+) : arthritis: OA. Abdominal:                    Anesthesia Plan    ASA 3     general     intravenous induction   Anesthetic plan and risks discussed with patient.     Plan discussed with CRNA and surgical team.            Ramin Mccullough MD   10/2/2017 allow it to droop. Round your back up toward the ceiling until you feel a nice stretch in your upper, middle, and lower back. Hold this stretch for as long as it feels comfortable, or about 15 to 30 seconds. 7. Return to the starting position with a flat back while you are on your hands and knees. 8. Let your back sway by pressing your stomach toward the floor. Lift your buttocks toward the ceiling. 9. Hold this position for 15 to 30 seconds. 10. Repeat 2 to 4 times. Follow-up care is a key part of your treatment and safety. Be sure to make and go to all appointments, and call your doctor if you are having problems. It's also a good idea to know your test results and keep a list of the medicines you take. Where can you learn more? Go to http://britton-agusto.info/. Enter O456 in the search box to learn more about \"Low Back Arthritis: Exercises. \"  Current as of: September 20, 2018  Content Version: 12.1  © 5718-2811 [employer]. Care instructions adapted under license by [employer] (which disclaims liability or warranty for this information). If you have questions about a medical condition or this instruction, always ask your healthcare professional. [address] any warranty or liability for your use of this information.

## 2022-03-18 PROBLEM — E66.01 SEVERE OBESITY (HCC): Status: ACTIVE | Noted: 2019-09-09

## 2022-03-18 PROBLEM — M47.812 CERVICAL FACET SYNDROME: Status: ACTIVE | Noted: 2017-07-12

## 2022-03-18 PROBLEM — M54.2 CERVICALGIA: Status: ACTIVE | Noted: 2017-07-12

## 2022-03-18 PROBLEM — G89.4 CHRONIC PAIN SYNDROME: Status: ACTIVE | Noted: 2017-07-12

## 2022-03-19 PROBLEM — M51.36 LUMBAR DEGENERATIVE DISC DISEASE: Status: ACTIVE | Noted: 2017-07-12

## 2022-03-19 PROBLEM — M47.816 SPONDYLOSIS OF LUMBAR REGION WITHOUT MYELOPATHY OR RADICULOPATHY: Status: ACTIVE | Noted: 2017-07-12

## 2022-03-19 PROBLEM — M54.16 LUMBAR RADICULOPATHY: Status: ACTIVE | Noted: 2017-07-12

## 2022-04-18 ENCOUNTER — TRANSCRIBE ORDER (OUTPATIENT)
Dept: REGISTRATION | Age: 57
End: 2022-04-18

## 2022-04-18 ENCOUNTER — HOSPITAL ENCOUNTER (OUTPATIENT)
Dept: GENERAL RADIOLOGY | Age: 57
Discharge: HOME | End: 2022-04-18
Payer: OTHER GOVERNMENT

## 2022-04-18 ENCOUNTER — HOSPITAL ENCOUNTER (OUTPATIENT)
Dept: PREADMISSION TESTING | Age: 57
Discharge: HOME | End: 2022-04-18
Payer: OTHER GOVERNMENT

## 2022-04-18 DIAGNOSIS — Z01.812 BLOOD TESTS PRIOR TO TREATMENT OR PROCEDURE: Primary | ICD-10-CM

## 2022-04-18 DIAGNOSIS — M16.11 PRIMARY OSTEOARTHRITIS OF RIGHT HIP: Primary | ICD-10-CM

## 2022-04-18 DIAGNOSIS — R94.31 EKG ABNORMALITIES: Primary | ICD-10-CM

## 2022-04-18 DIAGNOSIS — M16.11 PRIMARY OSTEOARTHRITIS OF RIGHT HIP: ICD-10-CM

## 2022-04-18 DIAGNOSIS — Z01.812 BLOOD TESTS PRIOR TO TREATMENT OR PROCEDURE: ICD-10-CM

## 2022-04-18 LAB
ABO + RH BLD: NORMAL
ALBUMIN SERPL-MCNC: 4.1 G/DL (ref 3.4–5)
ALBUMIN/GLOB SERPL: 1.1 {RATIO} (ref 0.8–1.7)
ALP SERPL-CCNC: 78 U/L (ref 45–117)
ALT SERPL-CCNC: 43 U/L (ref 13–56)
ANION GAP SERPL CALC-SCNC: 3 MMOL/L (ref 3–18)
APPEARANCE UR: CLEAR
APTT PPP: 26 SEC (ref 23–36.4)
AST SERPL-CCNC: 25 U/L (ref 10–38)
ATRIAL RATE: 64 BPM
BACTERIA URNS QL MICRO: NEGATIVE /HPF
BASOPHILS # BLD: 0.1 K/UL (ref 0–0.1)
BASOPHILS NFR BLD: 1 % (ref 0–2)
BILIRUB SERPL-MCNC: 0.4 MG/DL (ref 0.2–1)
BILIRUB UR QL: NEGATIVE
BLOOD GROUP ANTIBODIES SERPL: NORMAL
BUN SERPL-MCNC: 14 MG/DL (ref 7–18)
BUN/CREAT SERPL: 17 (ref 12–20)
CALCIUM SERPL-MCNC: 9.7 MG/DL (ref 8.5–10.1)
CALCULATED P AXIS, ECG09: 21 DEGREES
CALCULATED R AXIS, ECG10: 42 DEGREES
CALCULATED T AXIS, ECG11: 60 DEGREES
CHLORIDE SERPL-SCNC: 100 MMOL/L (ref 100–111)
CO2 SERPL-SCNC: 33 MMOL/L (ref 21–32)
COLOR UR: YELLOW
CREAT SERPL-MCNC: 0.82 MG/DL (ref 0.6–1.3)
DIAGNOSIS, 93000: NORMAL
DIFFERENTIAL METHOD BLD: ABNORMAL
EOSINOPHIL # BLD: 0.4 K/UL (ref 0–0.4)
EOSINOPHIL NFR BLD: 5 % (ref 0–5)
EPITH CASTS URNS QL MICRO: NORMAL /LPF (ref 0–5)
ERYTHROCYTE [DISTWIDTH] IN BLOOD BY AUTOMATED COUNT: 12.1 % (ref 11.6–14.5)
EST. AVERAGE GLUCOSE BLD GHB EST-MCNC: 120 MG/DL
GLOBULIN SER CALC-MCNC: 3.6 G/DL (ref 2–4)
GLUCOSE SERPL-MCNC: 99 MG/DL (ref 74–99)
GLUCOSE UR STRIP.AUTO-MCNC: NEGATIVE MG/DL
HBA1C MFR BLD: 5.8 % (ref 4.2–5.6)
HCT VFR BLD AUTO: 42.4 % (ref 35–45)
HGB BLD-MCNC: 14.7 G/DL (ref 12–16)
HGB UR QL STRIP: NEGATIVE
IMM GRANULOCYTES # BLD AUTO: 0.1 K/UL (ref 0–0.04)
IMM GRANULOCYTES NFR BLD AUTO: 1 % (ref 0–0.5)
INR PPP: 1 (ref 0.8–1.2)
KETONES UR QL STRIP.AUTO: NEGATIVE MG/DL
LEUKOCYTE ESTERASE UR QL STRIP.AUTO: ABNORMAL
LYMPHOCYTES # BLD: 2.3 K/UL (ref 0.9–3.6)
LYMPHOCYTES NFR BLD: 26 % (ref 21–52)
MCH RBC QN AUTO: 30.9 PG (ref 24–34)
MCHC RBC AUTO-ENTMCNC: 34.7 G/DL (ref 31–37)
MCV RBC AUTO: 89.3 FL (ref 78–100)
MONOCYTES # BLD: 0.7 K/UL (ref 0.05–1.2)
MONOCYTES NFR BLD: 8 % (ref 3–10)
NEUTS SEG # BLD: 5.3 K/UL (ref 1.8–8)
NEUTS SEG NFR BLD: 60 % (ref 40–73)
NITRITE UR QL STRIP.AUTO: NEGATIVE
NRBC # BLD: 0 K/UL (ref 0–0.01)
NRBC BLD-RTO: 0 PER 100 WBC
P-R INTERVAL, ECG05: 166 MS
PH UR STRIP: 7 [PH] (ref 5–8)
PLATELET # BLD AUTO: 255 K/UL (ref 135–420)
PMV BLD AUTO: 10.2 FL (ref 9.2–11.8)
POTASSIUM SERPL-SCNC: 3.5 MMOL/L (ref 3.5–5.5)
PROT SERPL-MCNC: 7.7 G/DL (ref 6.4–8.2)
PROT UR STRIP-MCNC: NEGATIVE MG/DL
PROTHROMBIN TIME: 12.3 SEC (ref 11.5–15.2)
Q-T INTERVAL, ECG07: 410 MS
QRS DURATION, ECG06: 82 MS
QTC CALCULATION (BEZET), ECG08: 422 MS
RBC # BLD AUTO: 4.75 M/UL (ref 4.2–5.3)
RBC #/AREA URNS HPF: NEGATIVE /HPF (ref 0–5)
SODIUM SERPL-SCNC: 136 MMOL/L (ref 136–145)
SP GR UR REFRACTOMETRY: 1.01 (ref 1–1.03)
SPECIMEN EXP DATE BLD: NORMAL
UROBILINOGEN UR QL STRIP.AUTO: 0.2 EU/DL (ref 0.2–1)
VENTRICULAR RATE, ECG03: 64 BPM
WBC # BLD AUTO: 8.8 K/UL (ref 4.6–13.2)
WBC URNS QL MICRO: NORMAL /HPF (ref 0–5)

## 2022-04-18 PROCEDURE — 85610 PROTHROMBIN TIME: CPT

## 2022-04-18 PROCEDURE — 85730 THROMBOPLASTIN TIME PARTIAL: CPT

## 2022-04-18 PROCEDURE — 87086 URINE CULTURE/COLONY COUNT: CPT

## 2022-04-18 PROCEDURE — 73502 X-RAY EXAM HIP UNI 2-3 VIEWS: CPT

## 2022-04-18 PROCEDURE — 71045 X-RAY EXAM CHEST 1 VIEW: CPT

## 2022-04-18 PROCEDURE — 86900 BLOOD TYPING SEROLOGIC ABO: CPT

## 2022-04-18 PROCEDURE — 85025 COMPLETE CBC W/AUTO DIFF WBC: CPT

## 2022-04-18 PROCEDURE — 36415 COLL VENOUS BLD VENIPUNCTURE: CPT

## 2022-04-18 PROCEDURE — 80053 COMPREHEN METABOLIC PANEL: CPT

## 2022-04-18 PROCEDURE — 83036 HEMOGLOBIN GLYCOSYLATED A1C: CPT

## 2022-04-18 PROCEDURE — 81001 URINALYSIS AUTO W/SCOPE: CPT

## 2022-04-18 PROCEDURE — 93005 ELECTROCARDIOGRAM TRACING: CPT

## 2022-04-19 LAB
BACTERIA SPEC CULT: NORMAL
SERVICE CMNT-IMP: NORMAL
SERVICE CMNT-IMP: NORMAL

## 2022-04-22 ENCOUNTER — HOSPITAL ENCOUNTER (OUTPATIENT)
Dept: PREADMISSION TESTING | Age: 57
Discharge: HOME | End: 2022-04-22

## 2022-04-22 VITALS — HEIGHT: 65 IN | WEIGHT: 227.85 LBS | BODY MASS INDEX: 37.96 KG/M2

## 2022-04-22 RX ORDER — SODIUM CHLORIDE, SODIUM LACTATE, POTASSIUM CHLORIDE, CALCIUM CHLORIDE 600; 310; 30; 20 MG/100ML; MG/100ML; MG/100ML; MG/100ML
125 INJECTION, SOLUTION INTRAVENOUS CONTINUOUS
Status: CANCELLED | OUTPATIENT
Start: 2022-04-22

## 2022-04-22 RX ORDER — TRANEXAMIC ACID 10 MG/ML
1 INJECTION, SOLUTION INTRAVENOUS
Status: CANCELLED | OUTPATIENT
Start: 2022-04-27 | End: 2022-04-28

## 2022-04-22 RX ORDER — BUTALBITAL, ACETAMINOPHEN AND CAFFEINE 50; 325; 40 MG/1; MG/1; MG/1
1 TABLET ORAL
COMMUNITY

## 2022-04-22 RX ORDER — BUPROPION HYDROCHLORIDE 150 MG/1
150 TABLET ORAL DAILY
COMMUNITY

## 2022-04-22 RX ORDER — CEFAZOLIN SODIUM/WATER 2 G/20 ML
2 SYRINGE (ML) INTRAVENOUS ONCE
Status: CANCELLED | OUTPATIENT
Start: 2022-04-22 | End: 2022-04-22

## 2022-04-22 RX ORDER — IBUPROFEN 800 MG/1
800 TABLET ORAL 3 TIMES DAILY
COMMUNITY
End: 2022-04-29

## 2022-04-22 RX ORDER — TELMISARTAN 40 MG/1
40 TABLET ORAL DAILY
COMMUNITY

## 2022-04-22 RX ORDER — BISMUTH SUBSALICYLATE 262 MG
1 TABLET,CHEWABLE ORAL DAILY
COMMUNITY

## 2022-04-22 NOTE — PERIOP NOTES
Mild sleep apnea but no machine. No removable prosthetic devices or family history of malignant hyperthermia. Care fusion kit and instructions given and reviewed. PCP is aware of the surgery. No participation in clinical trial or research study. Do not bring any valuables on DOS- jewelry, wallet, cash, laptop, medications. Does not meet criteria for special population at this time. Possible time delay day of surgery reviewed. Covid card-media. DNR status-none.

## 2022-04-27 ENCOUNTER — APPOINTMENT (OUTPATIENT)
Dept: GENERAL RADIOLOGY | Age: 57
DRG: 470 | End: 2022-04-27
Attending: ORTHOPAEDIC SURGERY
Payer: OTHER GOVERNMENT

## 2022-04-27 ENCOUNTER — ANESTHESIA (OUTPATIENT)
Dept: SURGERY | Age: 57
DRG: 470 | End: 2022-04-27
Payer: OTHER GOVERNMENT

## 2022-04-27 ENCOUNTER — HOSPITAL ENCOUNTER (INPATIENT)
Age: 57
LOS: 2 days | Discharge: HOME HEALTH | DRG: 470 | End: 2022-04-29
Attending: ORTHOPAEDIC SURGERY | Admitting: ORTHOPAEDIC SURGERY
Payer: OTHER GOVERNMENT

## 2022-04-27 ENCOUNTER — ANESTHESIA EVENT (OUTPATIENT)
Dept: SURGERY | Age: 57
DRG: 470 | End: 2022-04-27
Payer: OTHER GOVERNMENT

## 2022-04-27 DIAGNOSIS — M16.11 UNILATERAL PRIMARY OSTEOARTHRITIS, RIGHT HIP: Primary | ICD-10-CM

## 2022-04-27 LAB
ALBUMIN SERPL-MCNC: 3.4 G/DL (ref 3.4–5)
ALBUMIN/GLOB SERPL: 1.2 {RATIO} (ref 0.8–1.7)
ALP SERPL-CCNC: 57 U/L (ref 45–117)
ALT SERPL-CCNC: 41 U/L (ref 13–56)
ANION GAP SERPL CALC-SCNC: 9 MMOL/L (ref 3–18)
AST SERPL-CCNC: 46 U/L (ref 10–38)
BILIRUB SERPL-MCNC: 0.4 MG/DL (ref 0.2–1)
BUN SERPL-MCNC: 13 MG/DL (ref 7–18)
BUN/CREAT SERPL: 14 (ref 12–20)
CALCIUM SERPL-MCNC: 8.5 MG/DL (ref 8.5–10.1)
CHLORIDE SERPL-SCNC: 101 MMOL/L (ref 100–111)
CO2 SERPL-SCNC: 28 MMOL/L (ref 21–32)
CREAT SERPL-MCNC: 0.92 MG/DL (ref 0.6–1.3)
GLOBULIN SER CALC-MCNC: 2.8 G/DL (ref 2–4)
GLUCOSE BLD STRIP.AUTO-MCNC: 111 MG/DL (ref 70–110)
GLUCOSE BLD STRIP.AUTO-MCNC: 122 MG/DL (ref 70–110)
GLUCOSE SERPL-MCNC: 179 MG/DL (ref 74–99)
HCT VFR BLD AUTO: 35.9 % (ref 35–45)
HGB BLD-MCNC: 12 G/DL (ref 12–16)
POTASSIUM SERPL-SCNC: 3.8 MMOL/L (ref 3.5–5.5)
PROT SERPL-MCNC: 6.2 G/DL (ref 6.4–8.2)
SODIUM SERPL-SCNC: 138 MMOL/L (ref 136–145)

## 2022-04-27 PROCEDURE — C1776 JOINT DEVICE (IMPLANTABLE): HCPCS | Performed by: ORTHOPAEDIC SURGERY

## 2022-04-27 PROCEDURE — 77030002922 HC SUT FBRWRE ARTH -B: Performed by: ORTHOPAEDIC SURGERY

## 2022-04-27 PROCEDURE — 74011250636 HC RX REV CODE- 250/636: Performed by: ANESTHESIOLOGY

## 2022-04-27 PROCEDURE — 2709999900 HC NON-CHARGEABLE SUPPLY: Performed by: ORTHOPAEDIC SURGERY

## 2022-04-27 PROCEDURE — 77030008683 HC TU ET CUF COVD -A: Performed by: ANESTHESIOLOGY

## 2022-04-27 PROCEDURE — 97535 SELF CARE MNGMENT TRAINING: CPT

## 2022-04-27 PROCEDURE — 74011000250 HC RX REV CODE- 250: Performed by: NURSE ANESTHETIST, CERTIFIED REGISTERED

## 2022-04-27 PROCEDURE — 97530 THERAPEUTIC ACTIVITIES: CPT

## 2022-04-27 PROCEDURE — 80053 COMPREHEN METABOLIC PANEL: CPT

## 2022-04-27 PROCEDURE — 77030008477 HC STYL SATN SLP COVD -A: Performed by: ANESTHESIOLOGY

## 2022-04-27 PROCEDURE — 97166 OT EVAL MOD COMPLEX 45 MIN: CPT

## 2022-04-27 PROCEDURE — 85018 HEMOGLOBIN: CPT

## 2022-04-27 PROCEDURE — 77030005518 HC CATH URETH FOL 2W BARD -B: Performed by: ORTHOPAEDIC SURGERY

## 2022-04-27 PROCEDURE — 77030003028 HC SUT VCRL J&J -A: Performed by: ORTHOPAEDIC SURGERY

## 2022-04-27 PROCEDURE — 76210000016 HC OR PH I REC 1 TO 1.5 HR: Performed by: ORTHOPAEDIC SURGERY

## 2022-04-27 PROCEDURE — 74011250636 HC RX REV CODE- 250/636: Performed by: ORTHOPAEDIC SURGERY

## 2022-04-27 PROCEDURE — 65270000029 HC RM PRIVATE

## 2022-04-27 PROCEDURE — 77030018074 HC RTVR SUT ARTH4 S&N -B: Performed by: ORTHOPAEDIC SURGERY

## 2022-04-27 PROCEDURE — 82962 GLUCOSE BLOOD TEST: CPT

## 2022-04-27 PROCEDURE — 77030018842 HC SOL IRR SOD CL 9% BAXT -A: Performed by: ORTHOPAEDIC SURGERY

## 2022-04-27 PROCEDURE — 77030006643: Performed by: ANESTHESIOLOGY

## 2022-04-27 PROCEDURE — 73501 X-RAY EXAM HIP UNI 1 VIEW: CPT

## 2022-04-27 PROCEDURE — 0SR902Z REPLACEMENT OF RIGHT HIP JOINT WITH METAL ON POLYETHYLENE SYNTHETIC SUBSTITUTE, OPEN APPROACH: ICD-10-PCS | Performed by: ORTHOPAEDIC SURGERY

## 2022-04-27 PROCEDURE — 36415 COLL VENOUS BLD VENIPUNCTURE: CPT

## 2022-04-27 PROCEDURE — 77030040361 HC SLV COMPR DVT MDII -B: Performed by: ORTHOPAEDIC SURGERY

## 2022-04-27 PROCEDURE — 77030020782 HC GWN BAIR PAWS FLX 3M -B: Performed by: ORTHOPAEDIC SURGERY

## 2022-04-27 PROCEDURE — 77030002916 HC SUT ETHLN J&J -A: Performed by: ORTHOPAEDIC SURGERY

## 2022-04-27 PROCEDURE — 74011250636 HC RX REV CODE- 250/636: Performed by: NURSE ANESTHETIST, CERTIFIED REGISTERED

## 2022-04-27 PROCEDURE — 77030003029 HC SUT VCRL J&J -B: Performed by: ORTHOPAEDIC SURGERY

## 2022-04-27 PROCEDURE — 76060000040 HC ANESTHESIA 4.5 TO 5 HR: Performed by: ORTHOPAEDIC SURGERY

## 2022-04-27 PROCEDURE — 77010033678 HC OXYGEN DAILY

## 2022-04-27 PROCEDURE — 97162 PT EVAL MOD COMPLEX 30 MIN: CPT

## 2022-04-27 PROCEDURE — 77030008462 HC STPLR SKN PROX J&J -A: Performed by: ORTHOPAEDIC SURGERY

## 2022-04-27 PROCEDURE — 77030027138 HC INCENT SPIROMETER -A: Performed by: ORTHOPAEDIC SURGERY

## 2022-04-27 PROCEDURE — 74011250637 HC RX REV CODE- 250/637: Performed by: ORTHOPAEDIC SURGERY

## 2022-04-27 PROCEDURE — 76942 ECHO GUIDE FOR BIOPSY: CPT | Performed by: ORTHOPAEDIC SURGERY

## 2022-04-27 PROCEDURE — 77030038010: Performed by: ORTHOPAEDIC SURGERY

## 2022-04-27 PROCEDURE — 64450 NJX AA&/STRD OTHER PN/BRANCH: CPT | Performed by: ORTHOPAEDIC SURGERY

## 2022-04-27 PROCEDURE — 76010000136 HC OR TIME 4.5 TO 5 HR: Performed by: ORTHOPAEDIC SURGERY

## 2022-04-27 PROCEDURE — 77030013079 HC BLNKT BAIR HGGR 3M -A: Performed by: ANESTHESIOLOGY

## 2022-04-27 PROCEDURE — 77030018673: Performed by: ORTHOPAEDIC SURGERY

## 2022-04-27 PROCEDURE — 74011000250 HC RX REV CODE- 250: Performed by: ANESTHESIOLOGY

## 2022-04-27 PROCEDURE — 97116 GAIT TRAINING THERAPY: CPT

## 2022-04-27 PROCEDURE — 74011250637 HC RX REV CODE- 250/637: Performed by: ANESTHESIOLOGY

## 2022-04-27 PROCEDURE — 3E0T3BZ INTRODUCTION OF ANESTHETIC AGENT INTO PERIPHERAL NERVES AND PLEXI, PERCUTANEOUS APPROACH: ICD-10-PCS | Performed by: ANESTHESIOLOGY

## 2022-04-27 PROCEDURE — 3E0T33Z INTRODUCTION OF ANTI-INFLAMMATORY INTO PERIPHERAL NERVES AND PLEXI, PERCUTANEOUS APPROACH: ICD-10-PCS | Performed by: ANESTHESIOLOGY

## 2022-04-27 PROCEDURE — 77030013708 HC HNDPC SUC IRR PULS STRY –B: Performed by: ORTHOPAEDIC SURGERY

## 2022-04-27 PROCEDURE — 74011000250 HC RX REV CODE- 250: Performed by: ORTHOPAEDIC SURGERY

## 2022-04-27 DEVICE — LOW FRICTION ION TREATMENT
Type: IMPLANTABLE DEVICE | Site: HIP | Status: FUNCTIONAL
Brand: C-TAPER HEAD

## 2022-04-27 DEVICE — IMPLANTABLE DEVICE: Type: IMPLANTABLE DEVICE | Site: HIP | Status: FUNCTIONAL

## 2022-04-27 DEVICE — X3 INSERT FOR ADM/ MDM
Type: IMPLANTABLE DEVICE | Site: HIP | Status: FUNCTIONAL
Brand: X3

## 2022-04-27 DEVICE — LINER- CEMENTLESS
Type: IMPLANTABLE DEVICE | Site: HIP | Status: FUNCTIONAL
Brand: MDM

## 2022-04-27 DEVICE — TRIDENT II TRITANIUM CLUSTER 56F
Type: IMPLANTABLE DEVICE | Site: HIP | Status: FUNCTIONAL
Brand: TRIDENT II

## 2022-04-27 DEVICE — 127 DEGREE NECK ANGLE HIP STEM
Type: IMPLANTABLE DEVICE | Site: HIP | Status: FUNCTIONAL
Brand: SECUR-FIT

## 2022-04-27 DEVICE — 6.5MM LOW PROFILE HEX SCREW 25MM
Type: IMPLANTABLE DEVICE | Site: HIP | Status: FUNCTIONAL
Brand: TRIDENT II

## 2022-04-27 RX ORDER — PREGABALIN 25 MG/1
25 CAPSULE ORAL
Status: COMPLETED | OUTPATIENT
Start: 2022-04-27 | End: 2022-04-27

## 2022-04-27 RX ORDER — SUCCINYLCHOLINE CHLORIDE 100 MG/5ML
SYRINGE (ML) INTRAVENOUS AS NEEDED
Status: DISCONTINUED | OUTPATIENT
Start: 2022-04-27 | End: 2022-04-27 | Stop reason: HOSPADM

## 2022-04-27 RX ORDER — EPHEDRINE SULFATE/0.9% NACL/PF 50 MG/5 ML
SYRINGE (ML) INTRAVENOUS AS NEEDED
Status: DISCONTINUED | OUTPATIENT
Start: 2022-04-27 | End: 2022-04-27 | Stop reason: HOSPADM

## 2022-04-27 RX ORDER — ASPIRIN 325 MG
325 TABLET, DELAYED RELEASE (ENTERIC COATED) ORAL 2 TIMES DAILY
Status: DISCONTINUED | OUTPATIENT
Start: 2022-04-27 | End: 2022-04-29 | Stop reason: HOSPADM

## 2022-04-27 RX ORDER — CEFAZOLIN SODIUM/WATER 2 G/20 ML
2 SYRINGE (ML) INTRAVENOUS ONCE
Status: COMPLETED | OUTPATIENT
Start: 2022-04-27 | End: 2022-04-27

## 2022-04-27 RX ORDER — NALOXONE HYDROCHLORIDE 0.4 MG/ML
0.1 INJECTION, SOLUTION INTRAMUSCULAR; INTRAVENOUS; SUBCUTANEOUS
Status: DISCONTINUED | OUTPATIENT
Start: 2022-04-27 | End: 2022-04-27 | Stop reason: HOSPADM

## 2022-04-27 RX ORDER — HYDROMORPHONE HYDROCHLORIDE 1 MG/ML
0.5 INJECTION, SOLUTION INTRAMUSCULAR; INTRAVENOUS; SUBCUTANEOUS
Status: DISCONTINUED | OUTPATIENT
Start: 2022-04-27 | End: 2022-04-27 | Stop reason: HOSPADM

## 2022-04-27 RX ORDER — ACETAMINOPHEN 500 MG
1000 TABLET ORAL
Status: DISCONTINUED | OUTPATIENT
Start: 2022-04-27 | End: 2022-04-27

## 2022-04-27 RX ORDER — FENTANYL CITRATE 50 UG/ML
INJECTION, SOLUTION INTRAMUSCULAR; INTRAVENOUS AS NEEDED
Status: DISCONTINUED | OUTPATIENT
Start: 2022-04-27 | End: 2022-04-27 | Stop reason: HOSPADM

## 2022-04-27 RX ORDER — DEXAMETHASONE SODIUM PHOSPHATE 4 MG/ML
INJECTION, SOLUTION INTRA-ARTICULAR; INTRALESIONAL; INTRAMUSCULAR; INTRAVENOUS; SOFT TISSUE AS NEEDED
Status: DISCONTINUED | OUTPATIENT
Start: 2022-04-27 | End: 2022-04-27 | Stop reason: HOSPADM

## 2022-04-27 RX ORDER — PROPOFOL 10 MG/ML
INJECTION, EMULSION INTRAVENOUS AS NEEDED
Status: DISCONTINUED | OUTPATIENT
Start: 2022-04-27 | End: 2022-04-27 | Stop reason: HOSPADM

## 2022-04-27 RX ORDER — LIDOCAINE HYDROCHLORIDE 20 MG/ML
INJECTION, SOLUTION EPIDURAL; INFILTRATION; INTRACAUDAL; PERINEURAL AS NEEDED
Status: DISCONTINUED | OUTPATIENT
Start: 2022-04-27 | End: 2022-04-27 | Stop reason: HOSPADM

## 2022-04-27 RX ORDER — SODIUM CHLORIDE 0.9 % (FLUSH) 0.9 %
5-40 SYRINGE (ML) INJECTION EVERY 8 HOURS
Status: DISCONTINUED | OUTPATIENT
Start: 2022-04-27 | End: 2022-04-27 | Stop reason: HOSPADM

## 2022-04-27 RX ORDER — OXYCODONE AND ACETAMINOPHEN 5; 325 MG/1; MG/1
1-2 TABLET ORAL
Status: DISCONTINUED | OUTPATIENT
Start: 2022-04-27 | End: 2022-04-28

## 2022-04-27 RX ORDER — MAGNESIUM SULFATE 100 %
4 CRYSTALS MISCELLANEOUS AS NEEDED
Status: DISCONTINUED | OUTPATIENT
Start: 2022-04-27 | End: 2022-04-27 | Stop reason: HOSPADM

## 2022-04-27 RX ORDER — CEFAZOLIN SODIUM/WATER 2 G/20 ML
2 SYRINGE (ML) INTRAVENOUS EVERY 8 HOURS
Status: COMPLETED | OUTPATIENT
Start: 2022-04-27 | End: 2022-04-27

## 2022-04-27 RX ORDER — ROCURONIUM BROMIDE 10 MG/ML
INJECTION, SOLUTION INTRAVENOUS AS NEEDED
Status: DISCONTINUED | OUTPATIENT
Start: 2022-04-27 | End: 2022-04-27 | Stop reason: HOSPADM

## 2022-04-27 RX ORDER — ACETAMINOPHEN AND CODEINE PHOSPHATE 300; 30 MG/1; MG/1
30 TABLET ORAL
COMMUNITY
End: 2022-04-29

## 2022-04-27 RX ORDER — KETAMINE HYDROCHLORIDE 10 MG/ML
INJECTION, SOLUTION INTRAMUSCULAR; INTRAVENOUS AS NEEDED
Status: DISCONTINUED | OUTPATIENT
Start: 2022-04-27 | End: 2022-04-27 | Stop reason: HOSPADM

## 2022-04-27 RX ORDER — HYDROMORPHONE HYDROCHLORIDE 1 MG/ML
1 INJECTION, SOLUTION INTRAMUSCULAR; INTRAVENOUS; SUBCUTANEOUS
Status: DISCONTINUED | OUTPATIENT
Start: 2022-04-27 | End: 2022-04-29 | Stop reason: HOSPADM

## 2022-04-27 RX ORDER — OXYCODONE AND ACETAMINOPHEN 5; 325 MG/1; MG/1
1 TABLET ORAL AS NEEDED
Status: DISCONTINUED | OUTPATIENT
Start: 2022-04-27 | End: 2022-04-27 | Stop reason: HOSPADM

## 2022-04-27 RX ORDER — FACIAL-BODY WIPES
10 EACH TOPICAL DAILY PRN
Status: DISCONTINUED | OUTPATIENT
Start: 2022-04-27 | End: 2022-04-29 | Stop reason: HOSPADM

## 2022-04-27 RX ORDER — CALCIUM CARB/MAGNESIUM CARB 311-232MG
5 TABLET ORAL
Status: DISCONTINUED | OUTPATIENT
Start: 2022-04-27 | End: 2022-04-29 | Stop reason: HOSPADM

## 2022-04-27 RX ORDER — FENTANYL CITRATE 50 UG/ML
25 INJECTION, SOLUTION INTRAMUSCULAR; INTRAVENOUS
Status: DISCONTINUED | OUTPATIENT
Start: 2022-04-27 | End: 2022-04-27 | Stop reason: HOSPADM

## 2022-04-27 RX ORDER — MIDAZOLAM HYDROCHLORIDE 1 MG/ML
INJECTION, SOLUTION INTRAMUSCULAR; INTRAVENOUS AS NEEDED
Status: DISCONTINUED | OUTPATIENT
Start: 2022-04-27 | End: 2022-04-27 | Stop reason: HOSPADM

## 2022-04-27 RX ORDER — PHENYLEPHRINE HCL IN 0.9% NACL 1 MG/10 ML
SYRINGE (ML) INTRAVENOUS AS NEEDED
Status: DISCONTINUED | OUTPATIENT
Start: 2022-04-27 | End: 2022-04-27 | Stop reason: HOSPADM

## 2022-04-27 RX ORDER — SODIUM CHLORIDE 0.9 % (FLUSH) 0.9 %
5-40 SYRINGE (ML) INJECTION AS NEEDED
Status: DISCONTINUED | OUTPATIENT
Start: 2022-04-27 | End: 2022-04-29 | Stop reason: HOSPADM

## 2022-04-27 RX ORDER — AMOXICILLIN 250 MG
1 CAPSULE ORAL 2 TIMES DAILY
Status: DISCONTINUED | OUTPATIENT
Start: 2022-04-27 | End: 2022-04-29 | Stop reason: HOSPADM

## 2022-04-27 RX ORDER — SODIUM CHLORIDE, SODIUM LACTATE, POTASSIUM CHLORIDE, CALCIUM CHLORIDE 600; 310; 30; 20 MG/100ML; MG/100ML; MG/100ML; MG/100ML
50 INJECTION, SOLUTION INTRAVENOUS CONTINUOUS
Status: DISCONTINUED | OUTPATIENT
Start: 2022-04-27 | End: 2022-04-27 | Stop reason: HOSPADM

## 2022-04-27 RX ORDER — DIPHENHYDRAMINE HCL 25 MG
25 CAPSULE ORAL
Status: DISCONTINUED | OUTPATIENT
Start: 2022-04-27 | End: 2022-04-29 | Stop reason: HOSPADM

## 2022-04-27 RX ORDER — SODIUM CHLORIDE, SODIUM LACTATE, POTASSIUM CHLORIDE, CALCIUM CHLORIDE 600; 310; 30; 20 MG/100ML; MG/100ML; MG/100ML; MG/100ML
125 INJECTION, SOLUTION INTRAVENOUS CONTINUOUS
Status: DISCONTINUED | OUTPATIENT
Start: 2022-04-27 | End: 2022-04-29 | Stop reason: HOSPADM

## 2022-04-27 RX ORDER — NALOXONE HYDROCHLORIDE 0.4 MG/ML
0.4 INJECTION, SOLUTION INTRAMUSCULAR; INTRAVENOUS; SUBCUTANEOUS AS NEEDED
Status: DISCONTINUED | OUTPATIENT
Start: 2022-04-27 | End: 2022-04-29 | Stop reason: HOSPADM

## 2022-04-27 RX ORDER — ROPIVACAINE HYDROCHLORIDE 5 MG/ML
INJECTION, SOLUTION EPIDURAL; INFILTRATION; PERINEURAL
Status: COMPLETED | OUTPATIENT
Start: 2022-04-27 | End: 2022-04-27

## 2022-04-27 RX ORDER — VANCOMYCIN HYDROCHLORIDE 1 G/20ML
INJECTION, POWDER, LYOPHILIZED, FOR SOLUTION INTRAVENOUS AS NEEDED
Status: DISCONTINUED | OUTPATIENT
Start: 2022-04-27 | End: 2022-04-27 | Stop reason: HOSPADM

## 2022-04-27 RX ORDER — ONDANSETRON 2 MG/ML
INJECTION INTRAMUSCULAR; INTRAVENOUS AS NEEDED
Status: DISCONTINUED | OUTPATIENT
Start: 2022-04-27 | End: 2022-04-27 | Stop reason: HOSPADM

## 2022-04-27 RX ORDER — CELECOXIB 100 MG/1
400 CAPSULE ORAL
Status: COMPLETED | OUTPATIENT
Start: 2022-04-27 | End: 2022-04-27

## 2022-04-27 RX ORDER — INSULIN LISPRO 100 [IU]/ML
INJECTION, SOLUTION INTRAVENOUS; SUBCUTANEOUS ONCE
Status: DISCONTINUED | OUTPATIENT
Start: 2022-04-27 | End: 2022-04-27 | Stop reason: HOSPADM

## 2022-04-27 RX ORDER — ONDANSETRON 2 MG/ML
4 INJECTION INTRAMUSCULAR; INTRAVENOUS ONCE
Status: DISCONTINUED | OUTPATIENT
Start: 2022-04-27 | End: 2022-04-27 | Stop reason: HOSPADM

## 2022-04-27 RX ORDER — SODIUM CHLORIDE 0.9 % (FLUSH) 0.9 %
5-40 SYRINGE (ML) INJECTION AS NEEDED
Status: DISCONTINUED | OUTPATIENT
Start: 2022-04-27 | End: 2022-04-27 | Stop reason: HOSPADM

## 2022-04-27 RX ORDER — ONDANSETRON 2 MG/ML
4 INJECTION INTRAMUSCULAR; INTRAVENOUS
Status: DISCONTINUED | OUTPATIENT
Start: 2022-04-27 | End: 2022-04-29 | Stop reason: HOSPADM

## 2022-04-27 RX ORDER — TRANEXAMIC ACID 10 MG/ML
INJECTION, SOLUTION INTRAVENOUS AS NEEDED
Status: DISCONTINUED | OUTPATIENT
Start: 2022-04-27 | End: 2022-04-27 | Stop reason: HOSPADM

## 2022-04-27 RX ADMIN — HYDROMORPHONE HYDROCHLORIDE 1 MG: 1 INJECTION, SOLUTION INTRAMUSCULAR; INTRAVENOUS; SUBCUTANEOUS at 14:54

## 2022-04-27 RX ADMIN — DEXAMETHASONE SODIUM PHOSPHATE 4 MG: 4 INJECTION, SOLUTION INTRAMUSCULAR; INTRAVENOUS at 08:35

## 2022-04-27 RX ADMIN — ONDANSETRON HYDROCHLORIDE 4 MG: 2 INJECTION INTRAMUSCULAR; INTRAVENOUS at 15:09

## 2022-04-27 RX ADMIN — ROCURONIUM BROMIDE 20 MG: 10 INJECTION, SOLUTION INTRAVENOUS at 11:04

## 2022-04-27 RX ADMIN — HYDROMORPHONE HYDROCHLORIDE 0.5 MG: 1 INJECTION, SOLUTION INTRAMUSCULAR; INTRAVENOUS; SUBCUTANEOUS at 13:05

## 2022-04-27 RX ADMIN — ONDANSETRON HYDROCHLORIDE 4 MG: 2 INJECTION INTRAMUSCULAR; INTRAVENOUS at 12:16

## 2022-04-27 RX ADMIN — Medication 10 MG: at 09:07

## 2022-04-27 RX ADMIN — SODIUM CHLORIDE, SODIUM LACTATE, POTASSIUM CHLORIDE, AND CALCIUM CHLORIDE 50 ML/HR: 600; 310; 30; 20 INJECTION, SOLUTION INTRAVENOUS at 13:08

## 2022-04-27 RX ADMIN — Medication 100 MCG: at 10:29

## 2022-04-27 RX ADMIN — Medication 10 MG: at 09:54

## 2022-04-27 RX ADMIN — Medication 10 MG: at 09:48

## 2022-04-27 RX ADMIN — SODIUM CHLORIDE, SODIUM LACTATE, POTASSIUM CHLORIDE, AND CALCIUM CHLORIDE 125 ML/HR: 600; 310; 30; 20 INJECTION, SOLUTION INTRAVENOUS at 21:58

## 2022-04-27 RX ADMIN — Medication 2 G: at 08:00

## 2022-04-27 RX ADMIN — FENTANYL CITRATE 25 MCG: 50 INJECTION, SOLUTION INTRAMUSCULAR; INTRAVENOUS at 13:27

## 2022-04-27 RX ADMIN — Medication 10 MG: at 08:24

## 2022-04-27 RX ADMIN — ROCURONIUM BROMIDE 30 MG: 10 INJECTION, SOLUTION INTRAVENOUS at 09:17

## 2022-04-27 RX ADMIN — Medication 5 MG: at 21:55

## 2022-04-27 RX ADMIN — Medication 10 MG: at 11:04

## 2022-04-27 RX ADMIN — Medication 1 TABLET: at 21:55

## 2022-04-27 RX ADMIN — Medication 10 MG: at 10:40

## 2022-04-27 RX ADMIN — Medication 100 MCG: at 12:06

## 2022-04-27 RX ADMIN — KETAMINE HYDROCHLORIDE 10 MG: 10 INJECTION, SOLUTION INTRAMUSCULAR; INTRAVENOUS at 09:03

## 2022-04-27 RX ADMIN — Medication 2 G: at 23:34

## 2022-04-27 RX ADMIN — FENTANYL CITRATE 50 MCG: 50 INJECTION, SOLUTION INTRAMUSCULAR; INTRAVENOUS at 08:04

## 2022-04-27 RX ADMIN — KETAMINE HYDROCHLORIDE 10 MG: 10 INJECTION, SOLUTION INTRAMUSCULAR; INTRAVENOUS at 09:29

## 2022-04-27 RX ADMIN — LIDOCAINE HYDROCHLORIDE 100 MG: 20 INJECTION, SOLUTION INTRAVENOUS at 08:04

## 2022-04-27 RX ADMIN — FENTANYL CITRATE 50 MCG: 50 INJECTION, SOLUTION INTRAMUSCULAR; INTRAVENOUS at 12:49

## 2022-04-27 RX ADMIN — ASPIRIN 325 MG: 325 TABLET, COATED ORAL at 21:55

## 2022-04-27 RX ADMIN — Medication 100 MCG: at 10:11

## 2022-04-27 RX ADMIN — Medication 2 G: at 14:53

## 2022-04-27 RX ADMIN — OXYCODONE AND ACETAMINOPHEN 2 TABLET: 5; 325 TABLET ORAL at 17:26

## 2022-04-27 RX ADMIN — CELECOXIB 400 MG: 100 CAPSULE ORAL at 06:52

## 2022-04-27 RX ADMIN — Medication 100 MCG: at 11:04

## 2022-04-27 RX ADMIN — Medication 10 MG: at 08:40

## 2022-04-27 RX ADMIN — PROPOFOL 200 MG: 10 INJECTION, EMULSION INTRAVENOUS at 08:06

## 2022-04-27 RX ADMIN — Medication 100 MCG: at 11:31

## 2022-04-27 RX ADMIN — ROCURONIUM BROMIDE 10 MG: 10 INJECTION, SOLUTION INTRAVENOUS at 08:05

## 2022-04-27 RX ADMIN — FENTANYL CITRATE 25 MCG: 50 INJECTION, SOLUTION INTRAMUSCULAR; INTRAVENOUS at 13:49

## 2022-04-27 RX ADMIN — Medication 10 MG: at 08:34

## 2022-04-27 RX ADMIN — ROCURONIUM BROMIDE 40 MG: 10 INJECTION, SOLUTION INTRAVENOUS at 08:10

## 2022-04-27 RX ADMIN — Medication 10 MG: at 11:47

## 2022-04-27 RX ADMIN — FENTANYL CITRATE 25 MCG: 50 INJECTION, SOLUTION INTRAMUSCULAR; INTRAVENOUS at 13:37

## 2022-04-27 RX ADMIN — Medication 100 MG: at 08:06

## 2022-04-27 RX ADMIN — OXYCODONE AND ACETAMINOPHEN 2 TABLET: 5; 325 TABLET ORAL at 21:56

## 2022-04-27 RX ADMIN — Medication 10 MG: at 10:02

## 2022-04-27 RX ADMIN — Medication 10 MG: at 10:29

## 2022-04-27 RX ADMIN — KETAMINE HYDROCHLORIDE 20 MG: 10 INJECTION, SOLUTION INTRAMUSCULAR; INTRAVENOUS at 07:47

## 2022-04-27 RX ADMIN — TRANEXAMIC ACID 1 G: 10 INJECTION, SOLUTION INTRAVENOUS at 08:47

## 2022-04-27 RX ADMIN — SODIUM CHLORIDE, SODIUM LACTATE, POTASSIUM CHLORIDE, AND CALCIUM CHLORIDE 125 ML/HR: 600; 310; 30; 20 INJECTION, SOLUTION INTRAVENOUS at 06:55

## 2022-04-27 RX ADMIN — MIDAZOLAM HYDROCHLORIDE 5 MG: 1 INJECTION, SOLUTION INTRAMUSCULAR; INTRAVENOUS at 07:47

## 2022-04-27 RX ADMIN — ROPIVACAINE HYDROCHLORIDE 20 ML: 5 INJECTION, SOLUTION EPIDURAL; INFILTRATION; PERINEURAL at 07:50

## 2022-04-27 RX ADMIN — Medication 10 MG: at 10:10

## 2022-04-27 RX ADMIN — Medication 100 MCG: at 08:41

## 2022-04-27 RX ADMIN — SUGAMMADEX 100 MG: 100 INJECTION, SOLUTION INTRAVENOUS at 12:38

## 2022-04-27 RX ADMIN — Medication 100 MCG: at 10:55

## 2022-04-27 RX ADMIN — Medication 100 MCG: at 11:20

## 2022-04-27 RX ADMIN — Medication 10 MG: at 08:22

## 2022-04-27 RX ADMIN — KETAMINE HYDROCHLORIDE 20 MG: 10 INJECTION, SOLUTION INTRAMUSCULAR; INTRAVENOUS at 08:27

## 2022-04-27 RX ADMIN — PREGABALIN 25 MG: 25 CAPSULE ORAL at 06:52

## 2022-04-27 RX ADMIN — Medication 10 MG: at 11:44

## 2022-04-27 RX ADMIN — ONDANSETRON HYDROCHLORIDE 4 MG: 2 INJECTION INTRAMUSCULAR; INTRAVENOUS at 21:56

## 2022-04-27 RX ADMIN — HYDROMORPHONE HYDROCHLORIDE 0.5 MG: 1 INJECTION, SOLUTION INTRAMUSCULAR; INTRAVENOUS; SUBCUTANEOUS at 13:18

## 2022-04-27 RX ADMIN — ROCURONIUM BROMIDE 10 MG: 10 INJECTION, SOLUTION INTRAVENOUS at 11:47

## 2022-04-27 RX ADMIN — ROCURONIUM BROMIDE 20 MG: 10 INJECTION, SOLUTION INTRAVENOUS at 09:50

## 2022-04-27 NOTE — PERIOP NOTES
TRANSFER - IN REPORT:    Verbal report received from ORN & CRNA on Carvin Art  being received from OR (unit) for routine post - op      Report consisted of patients Situation, Background, Assessment and   Recommendations(SBAR). Information from the following report(s) SBAR was reviewed with the receiving nurse. Opportunity for questions and clarification was provided. Assessment completed upon patients arrival to unit and care assumed.

## 2022-04-27 NOTE — PERIOP NOTES
Reviewed PTA medication list with patient/caregiver and patient/caregiver denies any additional medications. Patient admits to having a responsible adult care for them at home for at least 24 hours after surgery. Patient encouraged to use gown warming system and informed that using said warming gown to regulate body temperature prior to a procedure has been shown to help reduce the risks of blood clots and infection. Patient's pharmacy of choice verified and documented in PTA medication section. Dual skin assessment & fall risk band verification completed with Ivet DIAZ.

## 2022-04-27 NOTE — PERIOP NOTES
TRANSFER - OUT REPORT:    Verbal report given to [facility], RN on Alvina Brady  being transferred to [address] [place] [address] ([facility]) for routine post - op       Report consisted of patients Situation, Background, Assessment and   Recommendations(SBAR). Information from the following report(s) SBAR was reviewed with the receiving nurse. Lines:   Peripheral IV 04/27/22 Left; Inner Forearm (Active)   Site Assessment Clean, dry, & intact 04/27/22 1344   Phlebitis Assessment 0 04/27/22 1344   Infiltration Assessment 0 04/27/22 1344   Dressing Status Clean, dry, & intact 04/27/22 1344   Dressing Type Tape;Transparent 04/27/22 1344   Hub Color/Line Status Infusing;Patent;Green 04/27/22 1344   Alcohol Cap Used No 04/27/22 0700        Opportunity for questions and clarification was provided.       Patient transported with:   Registered Nurse

## 2022-04-27 NOTE — PERIOP NOTES
Patient complains of numbness to right hand and arm aching and tingling. Pulse ox reads 100% with good capillary refill and strong right radial pulse, the hand is warm, Dr. Naomi Alas was made aware.

## 2022-04-27 NOTE — PERIOP NOTES
After moving for xray begins to complain of pain again received pain med per [facility] guidelines.

## 2022-04-27 NOTE — PERIOP NOTES
Family updated on patient current status at this time. Medication denied due to being requested to soon. Was Refilled on 10/12/20 for 30 caps and 1 refill.

## 2022-04-27 NOTE — H&P
Orthopedic Generic Pre-Op History and Physical    Subjective:     Patient is a 59 y.o.  female presented with a history of chronic right hip pain and mobility impairment. Onset of symptoms was gradual with gradually worsening course since that time. She is being admitted for surgical management of this condition. The indications for the procedure include chronic pain that has failed nonoperative treatment, mobility impairment, and diminished quality of life. Patient Active Problem List    Diagnosis Date Noted    Unilateral primary osteoarthritis, right hip 04/27/2022    Severe obesity ([address] 09/09/2019    Lumbar radiculopathy 07/12/2017    Lumbar degenerative disc disease 07/12/2017    Spondylosis of lumbar region without myelopathy or radiculopathy 07/12/2017    Chronic pain syndrome 07/12/2017    Cervicalgia 07/12/2017    Cervical facet syndrome 07/12/2017     Past Medical History:   Diagnosis Date    Allergies     High cholesterol     Hypertension before 2012    OA (osteoarthritis)     Psychiatric disorder     depression    Sleep apnea     mild- no machine      Past Surgical History:   Procedure Laterality Date    HX GI      colonoscopy    HX HEENT  2009    septoplasty    HX OTHER SURGICAL  2014    neck lift, liposuction    [place] BREAST SURGERY PROCEDURE UNLISTED  1998    reduction      Prior to Admission medications    Medication Sig Start Date End Date Taking? Authorizing Provider   acetaminophen-codeine (TYLENOL #3) 300-30 mg per tablet Take 30 mg by mouth DIALYSIS PRN. Yes Provider, Historical   telmisartan (MICARDIS) 40 mg tablet Take 40 mg by mouth daily. Indications: high blood pressure   Yes Provider, Historical   buPROPion XL (Wellbutrin XL) 150 mg tablet Take 150 mg by mouth daily. Indications: depression   Yes Provider, Historical   ibuprofen (MOTRIN) 800 mg tablet Take 800 mg by mouth three (3) times daily.    Yes Provider, Historical   multivitamin (ONE A DAY) tablet Take 1 Tablet by mouth daily. Yes Provider, Historical   diclofenac (VOLTAREN) 1 % gel Apply  to affected area four (4) times daily. Yes Provider, Historical   hydroCHLOROthiazide (HYDRODIURIL) 50 mg tablet Take 50 mg by mouth daily. Indications: high blood pressure   Yes Provider, Historical   amLODIPine (NORVASC) 5 mg tablet Take 5 mg by mouth daily. Indications: high blood pressure   Yes Provider, Historical   fluticasone propionate (FLONASE ALLERGY RELIEF) 50 mcg/actuation nasal spray 2 Sprays by Both Nostrils route daily. Indications: inflammation of the nose due to an allergy   Yes Provider, Historical   ZOLMitriptan (ZOMIG) 5 mg tablet Take 5 mg by mouth as needed for Migraine. Yes Provider, Historical   simvastatin (ZOCOR) 20 mg tablet Take 20 mg by mouth nightly. Indications: high cholesterol   Yes Provider, Historical   zolpidem (AMBIEN) 5 mg tablet Take 10 mg by mouth nightly. Yes Provider, Historical   propranolol LA (INDERAL LA) 120 mg SR capsule Take 240 mg by mouth daily. Indications: migraine prevention   Yes Provider, Historical   gabapentin (NEURONTIN) 300 mg capsule Take 300 mg by mouth four (4) times daily. Indications: neuropathic pain   Yes Provider, Historical   butalbital-acetaminophen-caffeine (FIORICET, ESGIC) -40 mg per tablet Take 1 Tablet by mouth as needed for Headache. Indications: a migraine headache    Provider, Historical     Allergies   Allergen Reactions    Oats Nausea Only      Social History     Tobacco Use    Smoking status: Never Smoker    Smokeless tobacco: Never Used   Substance Use Topics    Alcohol use: Never      No family history on file. Review of Systems   Constitutional: Negative. Eyes: Negative. Respiratory: Negative. Cardiovascular: Negative. Gastrointestinal: Negative. Genitourinary: Negative. Musculoskeletal: Positive for arthralgias and joint swelling. Neurological: Positive for numbness. Psychiatric/Behavioral: Negative. Objective:     Patient Vitals for the past 8 hrs:   BP Temp Pulse Resp SpO2 Height Weight   04/27/22 0620 (!) 140/89 97.8 °F (36.6 °C) 65 16 100 % 5' 5\" (1.651 m) 105.2 kg (231 lb 14.4 oz)       Physical Exam  Constitutional:       Appearance: Normal appearance. HENT:      Head: Normocephalic and atraumatic. Eyes:      Extraocular Movements: Extraocular movements intact. Cardiovascular:      Rate and Rhythm: Normal rate and regular rhythm. Pulses: Normal pulses. Pulmonary:      Effort: Pulmonary effort is normal.   Musculoskeletal:         General: Tenderness present. Skin:     General: Skin is warm. Capillary Refill: Capillary refill takes less than 2 seconds. Neurological:      Mental Status: She is alert and oriented to person, place, and time. Psychiatric:         Mood and Affect: Mood normal.         Imaging Review  End stage degenerative changes of right hip with 80 % loss of joint space and few marginal osteophytes. Assessment:     Active Problems:    Unilateral primary osteoarthritis, right hip (4/27/2022)        Plan:     The various methods of treatment have been discussed with the patient and family. After consideration of risks, benefits and other options for treatment, the patient has consented to surgery.   Questions were answered and Pre-op teaching was done by nurse

## 2022-04-27 NOTE — PROGRESS NOTES
Problem: Self Care Deficits Care Plan (Adult)  Goal: *Acute Goals and Plan of Care (Insert Text)  Description: Occupational Therapy Goals  Initiated 4/27/2022 within 7 day(s). 1.  Patient will perform grooming with modified independence standing at sink for 5 minutes or more. 2.  Patient will perform lower body dressing with modified independence. 3.  Patient will perform toilet transfers with modified independence. 4.  Patient will perform all aspects of toileting with modified independence. 5.  Patient will utilize energy conservation techniques during functional activities with verbal, visual, and tactile cues. OCCUPATIONAL THERAPY EVALUATION    Patient: Nava Marcum (15 y.o. female)  Date: 4/27/2022  Primary Diagnosis: Unilateral primary osteoarthritis, right hip [M16.11]  Procedure(s) (LRB):  RIGHT TOTAL HIP ARTHROPLASTY W/C-ARM (Right) Day of Surgery   Precautions:   Fall,WBAT (Posterior hip precautions)  PLOF: independent with ADLs and transfers     ASSESSMENT :  Based on the objective data described below, the patient presents with decreased strength, endurance and balance for carryover of ADLs and transfers following R JÚNIOR. Pt co-treat with PT for the need of another set of skilled hands and safety with transfers/ADLs. Pt participate with UB and LB dressing, bed mobility, supine<>sit, sit<>stand transfers with mod-max A x2 and additional time during this session. Reviewed posterior hip replacement precautions and pt demo understanding for the same. Pt was left supine in bed at the end of session in NAD. Pt education on icing, elevation and safety precautions for ADLs and transfers reviewed. Pt and family verbalized understanding for the same. Patient will benefit from skilled intervention to address the above impairments.   Patient's rehabilitation potential is considered to be Good  Factors which may influence rehabilitation potential include:   [x]             None noted  [] Mental ability/status  []             Medical condition  []             Home/family situation and support systems  []             Safety awareness  []             Pain tolerance/management  []             Other:      PLAN :  Recommendations and Planned Interventions:   [x]               Self Care Training                  [x]      Therapeutic Activities  [x]               Functional Mobility Training   []      Cognitive Retraining  []               Therapeutic Exercises           [x]      Endurance Activities  [x]               Balance Training                    []      Neuromuscular Re-Education  []               Visual/Perceptual Training     [x]      Home Safety Training  [x]               Patient Education                   [x]      Family Training/Education  []               Other (comment):    Frequency/Duration: Patient will be followed by occupational therapy 1-2 times per day/4-7 days per week to address goals.   Discharge Recommendations: Home Health  Further Equipment Recommendations for Discharge: N/A     SUBJECTIVE:   Patient stated  I will try my best.    OBJECTIVE DATA SUMMARY:     Past Medical History:   Diagnosis Date    Allergies     High cholesterol     Hypertension before 2012    OA (osteoarthritis)     Psychiatric disorder     depression    Sleep apnea     mild- no machine     Past Surgical History:   Procedure Laterality Date    HX GI      colonoscopy    HX HEENT  2009    septoplasty    HX OTHER SURGICAL  2014    neck lift, liposuction    ID BREAST SURGERY PROCEDURE UNLISTED  1998    reduction     Barriers to Learning/Limitations: None  Compensate with: visual, verbal, tactile, kinesthetic cues/model    Home Situation:   Home Situation  Home Environment: Private residence  # Steps to Enter: 2  Rails to Enter: No  One/Two Story Residence: Two story, live on 1st floor  # of Interior Steps: 15  Living Alone: No  Support Systems: Spouse/Significant Other,Child(reaagn)  Patient Expects to be Discharged to[de-identified] Home  Current DME Used/Available at Home: Commode, bedside,Shower chair,Walker, rolling  Tub or Shower Type: Shower  []  Right hand dominant   []  Left hand dominant    Cognitive/Behavioral Status:  Neurologic State: Alert  Orientation Level: Oriented X4  Cognition: Appropriate for age attention/concentration; Follows commands  Safety/Judgement: Fall prevention    Skin: intact  Edema: none    Vision/Perceptual:    Tracking: Able to track stimulus in all quadrants w/o difficulty    Coordination: BUE  Coordination: Within functional limits  Fine Motor Skills-Upper: Left Intact; Right Intact    Gross Motor Skills-Upper: Left Intact; Right Intact  Balance:  Sitting: Impaired; With support  Sitting - Static: Good (unsupported)  Sitting - Dynamic: Fair (occasional)  Standing: Impaired;Pull to stand; With support  Standing - Static: Fair  Standing - Dynamic : Fair  Strength: BUE  Strength: Generally decreased, functional  Tone & Sensation: BUE  Sensation: Intact  Range of Motion: BUE  AROM: Generally decreased, functional  Functional Mobility and Transfers for ADLs:  Bed Mobility:  Supine to Sit: Moderate assistance; Additional time;Assist x2  Sit to Supine: Moderate assistance;Assist x2; Additional time  Scooting: Minimum assistance; Moderate assistance  Transfers:  Sit to Stand: Moderate assistance;Minimum assistance; Additional time;Assist x2  Stand to Sit: Minimum assistance;Contact guard assistance; Additional time;Assist x2  ADL Assessment:   Feeding: Independent    Oral Facial Hygiene/Grooming: Contact guard assistance    Upper Body Dressing: Contact guard assistance    Lower Body Dressing: Maximum assistance    Toileting: Maximum assistance  ADL Intervention:  Upper Body Dressing Assistance  Dressing Assistance: Contact guard assistance  Shirt simulation with hospital gown: Contact guard assistance    Lower Body Dressing Assistance  Dressing Assistance: Maximum assistance  Socks: Maximum assistance  Leg Crossed Method Used: No  Position Performed: Long sitting on bed  Cues: Don    Cognitive Retraining  Safety/Judgement: Fall prevention    Pain:  Pain level pre-treatment: 8/10   Pain level post-treatment: 9/10   Pain Intervention(s): Medication (see MAR); Rest, Ice, Repositioning   Response to intervention: Nurse notified, See doc flow    Activity Tolerance:   Fair     Please refer to the flowsheet for vital signs taken during this treatment. After treatment:   [] Patient left in no apparent distress sitting up in chair  [x] Patient left in no apparent distress in bed  [x] Call bell left within reach  [x] Nursing notified  [x] Caregiver present  [] Bed alarm activated    COMMUNICATION/EDUCATION:   [x] Role of Occupational Therapy in the acute care setting  [x] Home safety education was provided and the patient/caregiver indicated understanding. [x] Patient/family have participated as able in goal setting and plan of care. [x] Patient/family agree to work toward stated goals and plan of care. [] Patient understands intent and goals of therapy, but is neutral about his/her participation. [] Patient is unable to participate in goal setting and plan of care. Thank you for this referral.  Dafne Herbert, OTR/L  Time Calculation: 54 mins    Eval Complexity: History: MEDIUM Complexity : Expanded review of history including physical, cognitive and psychosocial  history ; Examination: MEDIUM Complexity : 3-5 performance deficits relating to physical, cognitive , or psychosocial skils that result in activity limitations and / or participation restrictions;    Decision Making:LOW Complexity : No comorbidities that affect functional and no verbal or physical assistance needed to complete eval tasks

## 2022-04-27 NOTE — ANESTHESIA PROCEDURE NOTES
Peripheral Block    Start time: 4/27/2022 7:47 AM  End time: 4/27/2022 7:51 AM  Performed by: Lien Mulligan MD  Authorized by: Lien Mulligan MD       Pre-procedure:    Indications: at surgeon's request and post-op pain management    Preanesthetic Checklist: patient identified, risks and benefits discussed, site marked, timeout performed, anesthesia consent given and patient being monitored    Timeout Time: 07:47 EDT          Block Type:   Block Type:  Pericapsular Nerve Group (PENG)  Laterality:  Right  Monitoring:  Standard ASA monitoring, responsive to questions, continuous pulse ox, oxygen, frequent vital sign checks and heart rate  Injection Technique:  Single shot  Procedures: ultrasound guided    Patient Position: supine  Prep: chlorhexidine    Location:  Upper thigh  Needle Type:  Stimuplex  Needle Gauge:  21 G  Needle Localization:  Anatomical landmarks and ultrasound guidance  Medication Injected:  Ropivacaine (PF) (NAROPIN) 5 mg/mL (0.5 %) injection, 20 mL  Med Admin Time: 4/27/2022 7:50 AM    Assessment:  Number of attempts:  1  Injection Assessment:  Incremental injection every 5 mL, negative aspiration for CSF, no paresthesia, ultrasound image on chart, negative aspiration for blood and no intravascular symptoms  Patient tolerance:  Patient tolerated the procedure well with no immediate complications

## 2022-04-27 NOTE — ANESTHESIA PREPROCEDURE EVALUATION
Relevant Problems   ENDOCRINE   (+) Severe obesity (HCC)       Anesthetic History               Review of Systems / Medical History  Patient summary reviewed, nursing notes reviewed and pertinent labs reviewed    Pulmonary        Sleep apnea           Neuro/Psych         Psychiatric history     Cardiovascular    Hypertension                   GI/Hepatic/Renal  Within defined limits              Endo/Other        Morbid obesity and arthritis     Other Findings              Physical Exam    Airway  Mallampati: II  TM Distance: 4 - 6 cm  Neck ROM: normal range of motion   Mouth opening: Normal     Cardiovascular  Regular rate and rhythm,  S1 and S2 normal,  no murmur, click, rub, or gallop             Dental  No notable dental hx       Pulmonary  Breath sounds clear to auscultation               Abdominal  GI exam deferred       Other Findings            Anesthetic Plan    ASA: 3  Anesthesia type: general          Induction: Intravenous  Anesthetic plan and risks discussed with: Patient

## 2022-04-27 NOTE — PROGRESS NOTES
Patient arrived to floor via bed. She is alert and oriented x3, VSS, complains of pain from surgical site (5/10) and nausea but does not seem to be in any acute distress. Skin was double assessed with OR nurse. Skin is intact with an exception of his R hip surgical incision. Dressing is intact - xeroform, gauze, ABD pads,a nd tape. Pain medication given per MAR. MD paged regarding need for nausea medication.

## 2022-04-27 NOTE — ANESTHESIA POSTPROCEDURE EVALUATION
Post-Anesthesia Evaluation and Assessment    Cardiovascular Function/Vital Signs  Visit Vitals  /71   Pulse 67   Temp 36.6 °C (97.8 °F)   Resp 10   Ht 5' 5\" (1.651 m)   Wt 105.2 kg (231 lb 14.4 oz)   SpO2 99%   BMI 38.59 kg/m²       Patient is status post Procedure(s):  RIGHT TOTAL HIP ARTHROPLASTY W/C-ARM. Nausea/Vomiting: Controlled. Postoperative hydration reviewed and adequate. Pain:  Pain Scale 1: FLACC (04/27/22 1357)  Pain Intensity 1: 0 (04/27/22 1357)   Managed. Neurological Status:   Neuro (WDL): Within Defined Limits (04/27/22 0626)   At baseline. Mental Status and Level of Consciousness: Baseline and stable. Pulmonary Status:   O2 Device: Nasal cannula (04/27/22 1357)   Adequate oxygenation and airway patent. Complications related to anesthesia: None    Post-anesthesia assessment completed. No concerns. Patient has met all discharge requirements.     Signed By: Aristides Crooks MD

## 2022-04-27 NOTE — OP NOTES
Operative Report    Patient: Bonnie Grove   MRN: [id number]  SSN: Rose Gomez    YOB: 1965   Age: 59 y.o. Sex: female        Indications: Severe osteoarthritis right hip with symptoms limiting function. Date of Surgery: 4/27/2022      Preoperative Diagnosis:  SEVERE DEBILITATING RIGHT HIP PAIN    Postoperative Diagnosis: SEVERE DEBILITATING RIGHT HIP PAIN    Surgeon(s) and Role:     Sly Sweeney MD - Primary    Assistant: Dre Riggs DO      Anesthesia:  General    Procedures: Procedure(s):  RIGHT TOTAL HIP ARTHROPLASTY W/C-ARM AND PORTABLE X-RAY MACHINE    Procedure in Detail:  The patient was brought to the operating room and placed on the operating table in a supine position. Following the successful induction of anesthesia the patient was placed in the right lateral decubitus position. The right hip was scrubbed, prepped, and draped in the usual sterile fashion. A standard posterolateral style hip incision was carried down to the deep fascia. The short rotators were elevated with the capsule in a single sleeve and the hip was totally dislocated. Findings included severe end-stage osteoarthritic changes to the femoral head, acetabulum with fraying of the labrum. The femoral neck was osteotimized 1 cm above the trochanter in an oblique fashion, and marginal osteophytes on the femoral neck were removed with the aid of a rongeur and osteotome. Attention was then turned to the acetabulum, which was peripherally freed of soft tissue. There was an aberrant anatomy of the sciatic nerve with regard to the piriformis. I then carefully placed acetabular retractors and then reamed up to 52 mm diameter. No cystic changes were noted within the cleaned acetabular subchondral bone bed. A 52 mm Tritanium cup was then introduced in 40 degrees of abduction and 15 degrees of anteversion. This was markedly unstable and inferior on fluoroscopy.   I removed the 52 mm cup and reamed to 53 mm, inserting a new 54 mm cup. This was also unstable despite three screws in the acetabulum. I therefore removed this cup, did a clean up reaming to better contour the acetabular dome, with a final reamed diameter of 53 mm. I then impacted a 56 mm cup, ensuring it was well seated, and this was stable. Three screws were inserted to add additional rotational control, and the MDM liner was then inserted. Attention was then turned to the femur. The femur was opened with an awl, and then broached distally to a size 6 Secure Fit stem, reaming to size 6 as well. Trial reduction of the 28 mm inner/ 52 mm outer  head with a +10 neck length was undertaken. The hip was stable in all planes and leg lengths were equal.  The patient's components were in good position by fluoroscopy. With this stability, the trial components were removed and the size 6 implantable stem was impacted into position. A Restoration MDM X3 insert over a 28 mm metal inner head was impacted onto the trunion. The hip was reduced and taken through a range of motion and found to be stable. The wound was copiously irrigated with 5% Bet[place], followed by NS, and then closed in layers. The posterior capsule was repaired with #2 Fiberwire through drill holes in the proximal femur. The fascia regine was closed with #1 vicryl figure of eight sutures and the dermal layer with 2-0 vicryl buried interrupted sutures. The skin was reapposed with staples. A compression dressing was applied. Instrument, sponge, and needle counts were correct prior to wound closure and at the conclusion of the case. Findings: severe right hip degenerative joint disease    Estimated Blood Loss:  500 cc    Specimens: None    Implant:   Implant Name Type Inv.  Item Serial No.  Lot No. LRB No. Used Action   SCREW ACET HEX LOW PROFILE 6.5X25MM TRIDENT II - [id number]  SCREW ACET HEX LOW PROFILE 6.5X25MM TRIDENT II  [employer] [id number] Right 1 Implanted   SCREW ACET HEX LOW PROFILE 6.5X25MM TRIDENT II - [id number]  SCREW ACET HEX LOW PROFILE 6.5X25MM TRIDENT II  [employer] [id number] Right 1 Implanted   SCREW ACET HEX LOW PROFILE 6.5X25MM TRIDENT II - [id number]  SCREW ACET HEX LOW PROFILE 6.5X25MM TRIDENT II  [employer] [id number] Right 1 Implanted   SHELL ACET CLUS H 56F TRTANIUM -- TRIDENT II - [id number]  SHELL ACET CLUS H 56F TRTANIUM -- TRIDENT II  [employer] HOW_ [id number] Right 1 Implanted   LINER MDM COCR 46MM F --  - [id number]  LINER MDM COCR 46MM F --   [employer] HOW_ [id number] Right 1 Implanted   STEM FEM SZ 6 L120MM NK L25MM BASE OFFSET 33MM 127DEG HIP - [id number]  STEM FEM SZ 6 L120MM NK L25MM BASE OFFSET 33MM 127DEG HIP  [employer] HOW_ [id number] Right 1 Implanted   HEAD FEM C-TAPR 28MM +10 NK -- LOW FRIC COCR - [id number]  HEAD FEM C-TAPR 28MM +10 NK -- LOW FRIC COCR  [employer] HOW_ 7 Right 1 Implanted   INSERT ACET CUP X3 [id number] -- RESTORATION ADM - [id number]  INSERT ACET CUP X3 [id number] -- RESTORATION ADM  [employer] HOW_ [id number] Right 1 Implanted         Closure: Primary    Complications: None

## 2022-04-28 PROBLEM — M16.11 UNILATERAL PRIMARY OSTEOARTHRITIS, RIGHT HIP: Status: RESOLVED | Noted: 2022-04-27 | Resolved: 2022-04-28

## 2022-04-28 LAB
GLUCOSE BLD STRIP.AUTO-MCNC: 104 MG/DL (ref 70–110)
GLUCOSE BLD STRIP.AUTO-MCNC: 110 MG/DL (ref 70–110)
GLUCOSE BLD STRIP.AUTO-MCNC: 118 MG/DL (ref 70–110)
GLUCOSE BLD STRIP.AUTO-MCNC: 128 MG/DL (ref 70–110)

## 2022-04-28 PROCEDURE — 65270000029 HC RM PRIVATE

## 2022-04-28 PROCEDURE — 82962 GLUCOSE BLOOD TEST: CPT

## 2022-04-28 PROCEDURE — 97116 GAIT TRAINING THERAPY: CPT

## 2022-04-28 PROCEDURE — 97530 THERAPEUTIC ACTIVITIES: CPT

## 2022-04-28 PROCEDURE — 74011250637 HC RX REV CODE- 250/637: Performed by: ORTHOPAEDIC SURGERY

## 2022-04-28 PROCEDURE — 74011250636 HC RX REV CODE- 250/636: Performed by: ORTHOPAEDIC SURGERY

## 2022-04-28 RX ORDER — HYDROCODONE BITARTRATE AND ACETAMINOPHEN 5; 325 MG/1; MG/1
1 TABLET ORAL
Status: DISCONTINUED | OUTPATIENT
Start: 2022-04-28 | End: 2022-04-28

## 2022-04-28 RX ORDER — OXYCODONE AND ACETAMINOPHEN 5; 325 MG/1; MG/1
2 TABLET ORAL
Status: DISCONTINUED | OUTPATIENT
Start: 2022-04-28 | End: 2022-04-29 | Stop reason: HOSPADM

## 2022-04-28 RX ORDER — DOCUSATE SODIUM 100 MG/1
100 CAPSULE, LIQUID FILLED ORAL 2 TIMES DAILY
Qty: 28 CAPSULE | Refills: 0 | Status: SHIPPED
Start: 2022-04-28 | End: 2022-05-12

## 2022-04-28 RX ORDER — GABAPENTIN 300 MG/1
300 CAPSULE ORAL 3 TIMES DAILY
Status: DISCONTINUED | OUTPATIENT
Start: 2022-04-28 | End: 2022-04-29 | Stop reason: HOSPADM

## 2022-04-28 RX ORDER — NAPROXEN 250 MG/1
500 TABLET ORAL 2 TIMES DAILY WITH MEALS
Status: DISCONTINUED | OUTPATIENT
Start: 2022-04-28 | End: 2022-04-29 | Stop reason: HOSPADM

## 2022-04-28 RX ORDER — ASPIRIN 325 MG
325 TABLET, DELAYED RELEASE (ENTERIC COATED) ORAL 2 TIMES DAILY
Qty: 60 TABLET | Refills: 0 | Status: SHIPPED
Start: 2022-04-28 | End: 2022-05-28

## 2022-04-28 RX ORDER — OXYCODONE AND ACETAMINOPHEN 5; 325 MG/1; MG/1
1 TABLET ORAL
Status: DISCONTINUED | OUTPATIENT
Start: 2022-04-28 | End: 2022-04-29 | Stop reason: HOSPADM

## 2022-04-28 RX ORDER — BUPROPION HYDROCHLORIDE 150 MG/1
150 TABLET ORAL DAILY
Status: DISCONTINUED | OUTPATIENT
Start: 2022-04-28 | End: 2022-04-29 | Stop reason: HOSPADM

## 2022-04-28 RX ORDER — PROPRANOLOL HYDROCHLORIDE 80 MG/1
240 CAPSULE, EXTENDED RELEASE ORAL DAILY
Status: DISCONTINUED | OUTPATIENT
Start: 2022-04-28 | End: 2022-04-29 | Stop reason: HOSPADM

## 2022-04-28 RX ORDER — HYDROCODONE BITARTRATE AND ACETAMINOPHEN 10; 325 MG/1; MG/1
1 TABLET ORAL
Status: DISCONTINUED | OUTPATIENT
Start: 2022-04-28 | End: 2022-04-28

## 2022-04-28 RX ORDER — OXYCODONE AND ACETAMINOPHEN 5; 325 MG/1; MG/1
1 TABLET ORAL
Qty: 28 TABLET | Refills: 0 | Status: SHIPPED
Start: 2022-04-28 | End: 2022-05-05

## 2022-04-28 RX ADMIN — Medication 1 TABLET: at 20:15

## 2022-04-28 RX ADMIN — HYDROMORPHONE HYDROCHLORIDE 1 MG: 1 INJECTION, SOLUTION INTRAMUSCULAR; INTRAVENOUS; SUBCUTANEOUS at 09:30

## 2022-04-28 RX ADMIN — Medication 1 TABLET: at 09:31

## 2022-04-28 RX ADMIN — SODIUM CHLORIDE, SODIUM LACTATE, POTASSIUM CHLORIDE, AND CALCIUM CHLORIDE 125 ML/HR: 600; 310; 30; 20 INJECTION, SOLUTION INTRAVENOUS at 04:32

## 2022-04-28 RX ADMIN — OXYCODONE AND ACETAMINOPHEN 1 TABLET: 5; 325 TABLET ORAL at 22:41

## 2022-04-28 RX ADMIN — OXYCODONE AND ACETAMINOPHEN 1 TABLET: 5; 325 TABLET ORAL at 18:17

## 2022-04-28 RX ADMIN — ASPIRIN 325 MG: 325 TABLET, COATED ORAL at 20:15

## 2022-04-28 RX ADMIN — SODIUM CHLORIDE, SODIUM LACTATE, POTASSIUM CHLORIDE, AND CALCIUM CHLORIDE 125 ML/HR: 600; 310; 30; 20 INJECTION, SOLUTION INTRAVENOUS at 13:04

## 2022-04-28 RX ADMIN — OXYCODONE AND ACETAMINOPHEN 2 TABLET: 5; 325 TABLET ORAL at 06:38

## 2022-04-28 RX ADMIN — OXYCODONE AND ACETAMINOPHEN 2 TABLET: 5; 325 TABLET ORAL at 02:08

## 2022-04-28 RX ADMIN — BUPROPION HYDROCHLORIDE 150 MG: 150 TABLET, EXTENDED RELEASE ORAL at 20:15

## 2022-04-28 RX ADMIN — ASPIRIN 325 MG: 325 TABLET, COATED ORAL at 09:31

## 2022-04-28 RX ADMIN — HYDROCODONE BITARTRATE AND ACETAMINOPHEN 1 TABLET: 10; 325 TABLET ORAL at 12:58

## 2022-04-28 RX ADMIN — GABAPENTIN 300 MG: 300 CAPSULE ORAL at 22:40

## 2022-04-28 RX ADMIN — ONDANSETRON HYDROCHLORIDE 4 MG: 2 INJECTION INTRAMUSCULAR; INTRAVENOUS at 19:13

## 2022-04-28 RX ADMIN — NAPROXEN 500 MG: 250 TABLET ORAL at 15:08

## 2022-04-28 NOTE — DISCHARGE SUMMARY
Admit date: 4/27/2022   Admitting Provider: Shanti Peterson MD    Discharge date: 4/28/2022  Discharging Provider: Shanti Peterson MD      * Admission Diagnoses: Unilateral primary osteoarthritis, right hip [M16.11]    * Discharge Diagnoses:    Hospital Problems as of 4/28/2022 Date Reviewed: 4/27/2022          Codes Class Noted - Resolved POA    RESOLVED: Unilateral primary osteoarthritis, right hip ICD-10-CM: M16.11  ICD-9-CM: 715.15  4/27/2022 - 4/28/2022 Unknown              * Hospital Course: Patient was admitted s/p right JÚNIOR.  She was able to participate in PT on DOS.  On POD #1 her pain remained well controlled and she was able to ambulate with assistance. Sandra Cole was able to tolerate a regular diet and was able to void on her own.     On exam, patient was alert and oriented x 3.  They appeared comfortable.  Breathing was non labored.  The dressings were clean and dry and there were no focal neurovascular deficits.      Labs and post op imaging was reviewed.       Discharge instructions were provided. * Procedures:   Procedure(s):  RIGHT TOTAL HIP ARTHROPLASTY W/C-ARM      Consults: PT/OT    Significant Diagnostic Studies:   Recent Results (from the past 24 hour(s))   METABOLIC PANEL, COMPREHENSIVE    Collection Time: 04/27/22  4:20 PM   Result Value Ref Range    Sodium 138 136 - 145 mmol/L    Potassium 3.8 3.5 - 5.5 mmol/L    Chloride 101 100 - 111 mmol/L    CO2 28 21 - 32 mmol/L    Anion gap 9 3.0 - 18 mmol/L    Glucose 179 (H) 74 - 99 mg/dL    BUN 13 7.0 - 18 MG/DL    Creatinine 0.92 0.6 - 1.3 MG/DL    BUN/Creatinine ratio 14 12 - 20      GFR est AA >60 >60 ml/min/1.73m2    GFR est non-AA >60 >60 ml/min/1.73m2    Calcium 8.5 8.5 - 10.1 MG/DL    Bilirubin, total 0.4 0.2 - 1.0 MG/DL    ALT (SGPT) 41 13 - 56 U/L    AST (SGOT) 46 (H) 10 - 38 U/L    Alk.  phosphatase 57 45 - 117 U/L    Protein, total 6.2 (L) 6.4 - 8.2 g/dL    Albumin 3.4 3.4 - 5.0 g/dL    Globulin 2.8 2.0 - 4.0 g/dL    A-G Ratio 1.2 0.8 - 1.7     HGB & HCT    Collection Time: 04/27/22  4:20 PM   Result Value Ref Range    HGB 12.0 12.0 - 16.0 g/dL    HCT 35.9 35.0 - 45.0 %   GLUCOSE, POC    Collection Time: 04/27/22 11:24 PM   Result Value Ref Range    Glucose (POC) 122 (H) 70 - 110 mg/dL   GLUCOSE, POC    Collection Time: 04/28/22  6:41 AM   Result Value Ref Range    Glucose (POC) 104 70 - 110 mg/dL   GLUCOSE, POC    Collection Time: 04/28/22 11:45 AM   Result Value Ref Range    Glucose (POC) 128 (H) 70 - 110 mg/dL       * Discharge Condition: improved  * Disposition: Home    Discharge Medications:  Current Discharge Medication List      START taking these medications    Details   oxyCODONE-acetaminophen (PERCOCET) 5-325 mg per tablet Take 1 Tablet by mouth every six (6) hours as needed for Pain for up to 7 days. Max Daily Amount: 4 Tablets. Qty: 28 Tablet, Refills: 0  Start date: 4/28/2022, End date: 5/5/2022    Associated Diagnoses: Unilateral primary osteoarthritis, right hip      aspirin delayed-release 325 mg tablet Take 1 Tablet by mouth two (2) times a day for 30 days. Qty: 60 Tablet, Refills: 0  Start date: 4/28/2022, End date: 5/28/2022      docusate sodium (COLACE) 100 mg capsule Take 1 Capsule by mouth two (2) times a day for 14 days. Qty: 28 Capsule, Refills: 0  Start date: 4/28/2022, End date: 5/12/2022         CONTINUE these medications which have NOT CHANGED    Details   telmisartan (MICARDIS) 40 mg tablet Take 40 mg by mouth daily. Indications: high blood pressure      buPROPion XL (Wellbutrin XL) 150 mg tablet Take 150 mg by mouth daily. Indications: depression      butalbital-acetaminophen-caffeine (FIORICET, ESGIC) -40 mg per tablet Take 1 Tablet by mouth as needed for Headache. Indications: a migraine headache      multivitamin (ONE A DAY) tablet Take 1 Tablet by mouth daily. diclofenac (VOLTAREN) 1 % gel Apply  to affected area four (4) times daily.       hydroCHLOROthiazide (HYDRODIURIL) 50 mg tablet Take 50 mg by mouth daily. Indications: high blood pressure      amLODIPine (NORVASC) 5 mg tablet Take 5 mg by mouth daily. Indications: high blood pressure      fluticasone propionate (FLONASE ALLERGY RELIEF) 50 mcg/actuation nasal spray 2 Sprays by Both Nostrils route daily. Indications: inflammation of the nose due to an allergy      ZOLMitriptan (ZOMIG) 5 mg tablet Take 5 mg by mouth as needed for Migraine. simvastatin (ZOCOR) 20 mg tablet Take 20 mg by mouth nightly. Indications: high cholesterol      zolpidem (AMBIEN) 5 mg tablet Take 10 mg by mouth nightly. propranolol LA (INDERAL LA) 120 mg SR capsule Take 240 mg by mouth daily. Indications: migraine prevention      gabapentin (NEURONTIN) 300 mg capsule Take 300 mg by mouth four (4) times daily. Indications: neuropathic pain         STOP taking these medications       acetaminophen-codeine (TYLENOL #3) 300-30 mg per tablet Comments:   Reason for Stopping:         ibuprofen (MOTRIN) 800 mg tablet Comments:   Reason for Stopping:               * Follow-up Care/Patient Instructions:   Activity: Ambulate in house, No lifting, Driving, or Strenuous exercise for 6 weeks and PT/OT Eval and Treat  Diet: Regular Diet  Wound Care: Reinforce dressing PRN and As directed    Follow-up Information     Follow up With Specialties Details Why Contact Info    Sury Ambriz MD Orthopedic Surgery On 5/12/2022 Follow up appointment @ 11:45am [address]  [phone]      Jennifer Amaya NP Nurse Practitioner   [address] [place] [address] Str[address]  [phone]            Signed:  Patricia Del Valel MD  4/28/2022  12:29 PM normal

## 2022-04-28 NOTE — PROGRESS NOTES
conducted an initial consultation and Spiritual Assessment for Rashaad Lisa, who is a 59 y.o.,female. Patients Primary Language is: [place]. According to the patients EMR [employer] Affiliation is: Other. The reason the Patient came to the hospital is:   Patient Active Problem List    Diagnosis Date Noted    Unilateral primary osteoarthritis, right hip 04/27/2022    Severe obesity ([address] 09/09/2019    Lumbar radiculopathy 07/12/2017    Lumbar degenerative disc disease 07/12/2017    Spondylosis of lumbar region without myelopathy or radiculopathy 07/12/2017    Chronic pain syndrome 07/12/2017    Cervicalgia 07/12/2017    Cervical facet syndrome 07/12/2017        The  provided the following Interventions:  Initiated a relationship of care and support. Listened empathically. Provided information about Spiritual Care Services. Offered assurance of prayer on patient's behalf. Chart reviewed. Assessment:  Patient does not have any Rastafarian/cultural needs that will affect patients preferences in health care. Plan:  Chaplains will continue to follow and will provide pastoral care on an as needed/requested basis.  recommends bedside caregivers page  on duty if patient shows signs of acute spiritual or emotional distress. Rev.  Clarissa Little, Certified Respecting [address] [place] [address]  [phone]

## 2022-04-28 NOTE — PROGRESS NOTES
Cm was notified by Juana Sanchez from [address]  of in patient order needed, cm recommended for UR or UR physician to contact Janice Goff [phone] whom is covering for  at this time, cm also informed to call this cm back for further assistance once Lynnette Curry has been notified.

## 2022-04-28 NOTE — PROGRESS NOTES
Problem: Falls - Risk of  Goal: *Absence of Falls  Description: Document Lester Root Fall Risk and appropriate interventions in the flowsheet.   Outcome: Progressing Towards Goal  Note: Fall Risk Interventions:  Mobility Interventions: Assess mobility with egress test,Communicate number of staff needed for ambulation/transfer,Patient to call before getting OOB         Medication Interventions: Patient to call before getting OOB,Teach patient to arise slowly    Elimination Interventions: Call light in reach,Patient to call for help with toileting needs,Toileting schedule/hourly rounds              Problem: Patient Education: Go to Patient Education Activity  Goal: Patient/Family Education  Outcome: Progressing Towards Goal     Problem: Patient Education: Go to Patient Education Activity  Goal: Patient/Family Education  Outcome: Progressing Towards Goal     Problem: Patient Education: Go to Patient Education Activity  Goal: Patient/Family Education  Outcome: Progressing Towards Goal

## 2022-04-28 NOTE — PROGRESS NOTES
1025: Pt reports feeling exhausted, was up to bathroom and sat in chair, no resting in bed. Will follow up again for PT.    1135: 2nd attempt, pt sound asleep did not arouse to name, will follow up again.

## 2022-04-28 NOTE — NURSE NAVIGATOR
Vanessa Muro rounded on posterior hip replacement. Discussed the following during rounding: Activity:  OOB for all meals. Promoting Circulation  Ankle pumps 10 times an hour at hospital & home. Follow the hip precautions as instructed by Physical therapy and look at the education book to be reminded of hip precautions. Pain Control:  Pain medications side effects discussed. Use ice, distraction, moving, & change position to help with pain. Rest between activity. Reminded narcotics and anesthesia cause constipation so need to take stool softener/mild laxative daily while on narcotics. Reviewed how to safely elevate the leg after exercises for 30 minutes and before bed to decrease swelling while keeping your toes pointed to the ceiling and not crossing your legs. Incentive Spirometry:    Use of incentive spirometer 10 x/hr  Diet:   Eat for healing. Drink enough fluids so urine is lemonade in color. .   Reminded medication can cause nausea if taken on an empty stomach so need to eat before taking them. Patient Safety:   Call light & belongings in reach. Call for help when want to walk or get OOB.      Mobility Intervention:       [] Pt dangled at edge of bed    [x] Pt assisted OOB to bedside commode    [x] Pt assisted OOB to chair    [] Pt ambulated to bathroom    [] Patient was ambulated in room/hallway    Assistive Device Utilized:       [x] Rolling walker   [] Crutches   [] Back Brace   [] Knee immobilizer   [] Neck Collar    After Rounding and Checking on Patient     [x] Patient left in no apparent distress sitting up in chair  [] Patient left in no apparent distress in bed  [x] Call bell left within reach  [] SCDs on both legs & machine turned on  [] Knee immobilizer on  [x] Ice applied  [x] RN notified  []  present  [] Bed alarm activated    Reason patient not mobilized:      [] Patient refused   [] Nausea/vomiting   [] Low blood pressure   [] Drowsy/lethargic    Pain Rating:     [] 1  [] 6  [] 2  [] 7  [] 3  [] 8  [] 4  [] 9  [] 5  [] 10    Left patient with call light, cell phone and personal belongings in reach for safety. Padmaja Patrick verbalized understand. Given the opportunity for asking questions.    Nurse Navigator

## 2022-04-28 NOTE — PROGRESS NOTES
Problem: Mobility Impaired (Adult and Pediatric)  Goal: *Acute Goals and Plan of Care (Insert Text)  Description: Physical Therapy Goals  Initiated 4/27/2022  to be met within 3-7 days  STG's:  1. Bed mobility:  Supine to/from sit with S in prep for ADL activity. 2. Transfers:  Sit to/from stand with S/RW in prep for gait. LTG's  1. Ambulation:  Ambulate 150 ft.with S/RW for home mobility at discharge. 2. Step Negotiation: Ascend/Descend flight of steps with CGA with HR for home navigation as indicated. 3. Patient Education:  S/Independent with HEP for home performance accurately at discharge. Outcome: Progressing Towards Goal    []  Patient has met MD mobilization critieria for d/c home   []  Recommend HH with 24 hour adult care   [x]  Benefit from additional acute PT session to address:  Functional mobility and ROM/motor performance deficits      PHYSICAL THERAPY EVALUATION    Patient: Chun Boothe (21 y.o. female)  Date: 4/27/2022  Primary Diagnosis: Unilateral primary osteoarthritis, right hip [M16.11]  Procedure(s) (LRB):  RIGHT TOTAL HIP ARTHROPLASTY W/C-ARM (Right) Day of Surgery   Precautions:  Fall,WBAT (Posterior hip precautions)    PLOF: independent amb PTA    ASSESSMENT :  Based on the objective data described below, the patient presents with decreased ROM/motor performance RLE, decr'd balance, gait impairment as well as pain and anxiety impacting independence in overall functional mobility following above surgery. Reports pain 8/10 pre, 9/10 post session. Pt seen with OT for second set of skilled hands. Pt educated in Posterior Hip replacement precautions at start of and t/o session. Pt demonstrates bed mobility with mod A, additional time and assist of 2 required (assist for management of R LE). Requires min/mod A of 2 with bed elevated and additional time for completion of sit >stand transfers. Gait limited to 3 ft frwd/bckwd with RW/min A of 2.    Gait slow, step to antalgic gt pattern with additional time and tactile cues for advancement of R LE required. Pt became dizzy while backing up to bedside and required bed to be pushed to pt for safe transition to sit. Again required mod A of 2/additional time sit>supine. Pt assisted to move up in bed with bed in trendelenburg. Family present t/o session. Pt left supine with SCD's/ice in place, all needs in reach and nurse [facility] notified. Recommend HHPT vs SNF for follow up physical therapy upon discharge pending progress. Patient education: Mobility safety, WB, HEP, ice application/use, elevation, environmental safety and home safety techniques. Patient will benefit from skilled intervention to address the above impairments. Patient's rehabilitation potential is considered to be Fair  Factors which may influence rehabilitation potential include:   []         None noted  []         Mental ability/status  []         Medical condition  []         Home/family situation and support systems  []         Safety awareness  [x]         Pain tolerance/management  [x]         Other: anxiety     PLAN :  Recommendations and Planned Interventions:   [x]           Bed Mobility Training             []    Neuromuscular Re-Education  [x]           Transfer Training                   []    Orthotic/Prosthetic Training  [x]           Gait Training                          []    Modalities  [x]           Therapeutic Exercises           []    Edema Management/Control  [x]           Therapeutic Activities            [x]    Family Training/Education  [x]           Patient Education  []           Other (comment):    Frequency/Duration: Patient will be followed by physical therapy 1-2 times per day/4-7 days per week to address goals. Discharge Recommendations: Home Physical Therapy and Skilled Nursing Facility  Further Equipment Recommendations for Discharge: N/A     SUBJECTIVE:   Patient stated I've been better.     OBJECTIVE DATA SUMMARY:     Past Medical History:   Diagnosis Date    Allergies     High cholesterol     Hypertension before 2012    OA (osteoarthritis)     Psychiatric disorder     depression    Sleep apnea     mild- no machine     Past Surgical History:   Procedure Laterality Date    HX GI      colonoscopy    HX HEENT  2009    septoplasty    HX OTHER SURGICAL  2014    neck lift, liposuction    HI BREAST SURGERY PROCEDURE UNLISTED  1998    reduction     Barriers to Learning/Limitations: yes;  physical  Compensate with: Visual Cues, Verbal Cues, and Tactile Cues  Home Situation:  Home Situation  Home Environment: Private residence  # Steps to Enter: 2  Rails to Enter: No  One/Two Story Residence: Two story, live on 1st floor  # of Interior Steps: 15  Living Alone: No  Support Systems: Spouse/Significant Other,Child(reagan)  Patient Expects to be Discharged to[de-identified] Home  Current DME Used/Available at Home: Commode, bedside,Shower chair,Walker, rolling  Tub or Shower Type: Shower  Critical Behavior:  Neurologic State: Alert  Orientation Level: Oriented X4  Cognition: Appropriate for age attention/concentration; Follows commands  Safety/Judgement: Fall prevention  Psychosocial  Patient Behaviors: Calm; Cooperative; Anxious  Family  Behaviors: Calm;Supportive  Skin Condition/Temp: Warm;Dry  Family  Behaviors: Calm;Supportive  Skin Integrity: Incision (comment)  Skin Integumentary  Skin Color: Appropriate for ethnicity  Skin Condition/Temp: Warm;Dry  Skin Integrity: Incision (comment)  Strength:    Strength: Generally decreased, functional, R LE with limitation s/p surgery  Tone & Sensation:   Sensation: Intact  Range Of Motion:  AROM: Generally decreased, functional, R LE with limitation s/p surgery  Functional Mobility:  Bed Mobility:  Supine to Sit: Moderate assistance; Additional time;Assist x2  Sit to Supine: Moderate assistance;Assist x2; Additional time  Scooting: Minimum assistance; Moderate assistance  Transfers:  Sit to Stand:  Moderate assistance;Minimum assistance; Additional time;Assist x2  Stand to Sit: Minimum assistance;Contact guard assistance; Additional time;Assist x2  Balance:   Sitting: Impaired; With support  Sitting - Static: Good (unsupported)  Sitting - Dynamic: Fair (occasional)  Standing: Impaired;Pull to stand; With support  Standing - Static: Fair  Standing - Dynamic : Fair  Ambulation/Gait Training:  Distance (ft): 3 Feet (ft)  Assistive Device: Gait belt;Walker, rolling  Gait Description (WDL): Exceptions to WDL  Gait Abnormalities: Antalgic;Decreased step clearance; Step to gait  Base of Support: Widened;Shift to left  Stance: Right decreased;Time;Weight shift  Speed/Marian: Slow  Step Length: Right shortened;Left shortened  Swing Pattern: Right asymmetrical;Left asymmetrical  Interventions: Safety awareness training; Tactile cues; Verbal cues; Visual/Demos  Pain:  Pain level pre-treatment: 8/10   Pain level post-treatment: 9/10   Pain Intervention(s) : Medication (see MAR); Rest, Ice, Repositioning  Response to intervention: Nurse notified, See doc flow  Activity Tolerance:   Fair/fair- limited by pain, anxiety  Please refer to the flowsheet for vital signs taken during this treatment. After treatment:   []         Patient left in no apparent distress sitting up in chair  [x]         Patient left in no apparent distress in bed  [x]         Call bell left within reach  [x]         Nursing notified  [x]         Caregiver present  []         Bed alarm activated  [x]         SCDs applied    COMMUNICATION/EDUCATION:   [x]         Role of Physical Therapy in the acute care setting. [x]         Fall prevention education was provided and the patient/caregiver indicated understanding. [x]         Patient/family have participated as able in goal setting and plan of care. [x]         Patient/family agree to work toward stated goals and plan of care. []         Patient understands intent and goals of therapy, but is neutral about his/her participation.   [] Patient is unable to participate in goal setting/plan of care: ongoing with therapy staff.  []         Other: Thank you for this referral.  Naomi Garcias, PT   Time Calculation: 54 mins      Eval Complexity: History: HIGH Complexity :3+ comorbidities / personal factors will impact the outcome/ POC Exam:MEDIUM Complexity : 3 Standardized tests and measures addressing body structure, function, activity limitation and / or participation in recreation  Presentation: MEDIUM Complexity : Evolving with changing characteristics  Clinical Decision Making:Medium Complexity    Overall Complexity:MEDIUM    .

## 2022-04-28 NOTE — PROGRESS NOTES
Transition of Care (ALBERTO) Plan:          Pt admitted for an elective Right Total Hip Arthroplasty  surgical procedure, pt plans to return back home using prearrangements with [facility] thru [facility] office, pt does have a home rolling walker. Pt is independent and has family support, noted  pt has been c/o dizziness after sitting up, pt plans to stay overnight   Anticipate pt will be medically stable for discharge within the next 24-48 hours with physician follow up. CM available to assist as needed. ALBERTO Transportation:   How is patient being transported at discharge? Family/Friend      When? Once cleared by physician     Is transport scheduled? N/A      Follow-up appointment and transportation:   PCP/Specialist?  See AVS for Appointment         Who is transporting to the follow-up appointment? Self/Family/Friend      Is transport for follow up appointment scheduled? N/A    Communication plan (with patient/family): Who is being called? Patient or Next of Kin? Responsible party? Patient      What number(s) is to be used? See Facesheet      What service provider is calling for [facility] services? When are they calling? Readmission Risk? (Green/Low; Yellow/Moderate; Red/High):  Consuelo-Plfeliciano here to complete [address] including selection of the Healthcare Decision Maker Relationship (ie \"Primary\")  Care Management Interventions  PCP Verified by CM: Yes  Palliative Care Criteria Met (RRAT>21 & CHF Dx)?: No  Mode of Transport at Discharge:  Other (see comment) (family)  Transition of Care Consult (CM Consult): [address]: Yes  Physical Therapy Consult: Yes  Occupational Therapy Consult: Yes  Support Systems: Spouse/Significant Other  Confirm Follow Up Transport: Family  The Plan for Transition of Care is Related to the Following Treatment Goals : return home with family assistance  The Patient and/or Patient Representative was Provided with a Choice of Provider and Agrees with the Discharge Plan?: Yes  Freedom of Choice List was Provided with Basic Dialogue that Supports the Patient's Individualized Plan of Care/Goals, Treatment Preferences and Shares the Quality Data Associated with the Providers?: Yes  Discharge Location  Patient Expects to be Discharged to[de-identified] Home with home health

## 2022-04-28 NOTE — PROGRESS NOTES
Problem: Mobility Impaired (Adult and Pediatric)  Goal: *Acute Goals and Plan of Care (Insert Text)  Description: Physical Therapy Goals  Initiated 4/27/2022  to be met within 3-7 days  STG's:  1. Bed mobility:  Supine to/from sit with S in prep for ADL activity. 2. Transfers:  Sit to/from stand with S/RW in prep for gait. LTG's  1. Ambulation:  Ambulate 150 ft.with S/RW for home mobility at discharge. 2. Step Negotiation: Ascend/Descend flight of steps with CGA with HR for home navigation as indicated. 3. Patient Education:  S/Independent with HEP for home performance accurately at discharge. Outcome: Progressing Towards Goal   []  Patient has met MD mobilization j luis for d/c home   []  Recommend HH with 24 hour adult care   [x]  Benefit from additional acute PT session to address:  possible SNF placement    PHYSICAL THERAPY TREATMENT    Patient: Kathy Ceballos (39 y.o. female)  Date: 4/28/2022  Diagnosis: Unilateral primary osteoarthritis, right hip [M16.11] <principal problem not specified>  Procedure(s) (LRB):  RIGHT TOTAL HIP ARTHROPLASTY W/C-ARM (Right) 1 Day Post-Op  Precautions: Fall,WBAT (Posterior hip precautions)  PLOF: independent, ambulatory without AD, has a RW    ASSESSMENT:  Pt supine in bed upon arrival.  Pt having a lot of difficulty managing bed mobility. Mod/maxA and a lot of time needed to sit up EOB. C/o dizziness after sitting up. /76. Elevated bed to height of bed at home, CGA for sit to stand. Ambulated with RW, 20ft, step to gt pattern, very slow pace, no LOB. Returned to bed, maxA to return to supine. /71. Progression toward goals:   []      Improving appropriately and progressing toward goals  [x]      Improving slowly and progressing toward goals  []      Not making progress toward goals and plan of care will be adjusted     PLAN:  Patient continues to benefit from skilled intervention to address the above impairments.   Continue treatment per established plan of care. Discharge Recommendations:  Home Physical Therapy vs Skilled Nursing Facility  Further Equipment Recommendations for Discharge:  rolling walker     SUBJECTIVE:   Patient stated Do I have to stay another night?     OBJECTIVE DATA SUMMARY:   Critical Behavior:  Neurologic State: Alert  Orientation Level: Oriented X4  Cognition: Appropriate for age attention/concentration,Follows commands  Safety/Judgement: Fall prevention  Functional Mobility Training:  Bed Mobility:    Supine to Sit: Moderate assistance  Sit to Supine: Maximum assistance  Scooting: Additional time  Transfers:  Sit to Stand: Minimum assistance;Contact guard assistance  Stand to Sit: Contact guard assistance  Balance:  Sitting: With support  Sitting - Static: Fair (occasional)  Sitting - Dynamic: Fair (occasional)  Standing: Intact; With support  Standing - Static: Fair  Standing - Dynamic : Fair   Ambulation/Gait Training:  Distance (ft): 20 Feet (ft)  Assistive Device: Gait belt;Walker, rolling  Gait Abnormalities: Decreased step clearance; Step to gait  Speed/Marian: Slow        Pain:  Pain level pre-treatment: 6/10  Pain level post-treatment: 7/10   Pain Intervention(s): Medication (see MAR); Rest, Ice, Repositioning   Response to intervention: Nurse notified, See doc flow    Activity Tolerance:   poor  Please refer to the flowsheet for vital signs taken during this treatment. After treatment:   [] Patient left in no apparent distress sitting up in chair  [x] Patient left in no apparent distress in bed  [x] Call bell left within reach  [x] Nursing notified  [x] Caregiver present  [] Bed alarm activated  [x] SCDs applied      COMMUNICATION/EDUCATION:   [x]         Role of Physical Therapy in the acute care setting. [x]         Fall prevention education was provided and the patient/caregiver indicated understanding. [x]         Patient/family have participated as able in working toward goals and plan of care.   [x] Patient/family agree to work toward stated goals and plan of care. []         Patient understands intent and goals of therapy, but is neutral about his/her participation.   []         Patient is unable to participate in stated goals/plan of care: ongoing with therapy staff.  []         Other:        Fayrene Ratel, PTA   Time Calculation: 26 mins

## 2022-04-28 NOTE — PROGRESS NOTES
1901  Bedside shift change report given to [id number] [facility] [address] (oncoming nurse) by Jaylan Azar RN (offgoing nurse). Report included the following information SBAR, Kardex, Intake/Output and MAR.     0810  Assumed care at this time. Patient alert and oriented x4. Denies SOB, chest pain. Shows no signs of distress. Patient lungs clear diminished bilaterally. Cap refill < 3 sec to all extremities. Patient has 18 G IV to L forearm CDI. Stated pain 5/10. Dressing to R hip is CDI. SCDs applied to BLE. Incentive spirometer at bedside. Patient reaches 1250 on IS. Bowel sounds present. Skin intact. Patient call light and possessions within reach. Bed locked and in lowest position. Nurse will continue to monitor. [address] to Dr. Niraj Granda, covering for Dr. Adina Luu. Notified that patient is c/o percocet only bringing pain level down from an 8 to a 6 and just making her \"head swim. \" Patient also c/o dizziness with ambulation. Verbal order with readback: norco 5-325 Q4h prn moderate pain,  for severe pain. [address]  Notified Dr. Niraj Granda that norco did not help pain at all per pt. Percocet helped more. Verbal order with readback: reorder percocet, naprosyn 500 mg BID starting now. 1520  Dressing to R hip changed per protocol of Dr. Adina Luu using gauze, abd pad, and medipore tape. [id number]  Notified Dr. Niraj Granda that patient R knee (surgical leg) has a small swollen area on the lateral R side. Patient denies pain or tingling. No warmth or discoloration. Advised to keep monitoring and notify of any changes. 1800  Left voicemail for Dr. Adina Luu. Patient requesting home meds, including her BP meds. She states her \"ears are pounding which indicates her BP is too high. \" /73. BP has been trending lower than baseline throughout shift until now. Patient also was c/o dizziness until around 1530 pm today. KVOing fluids at this time. Encouraging oral intake. [address] [facility]  Dr. Adina Luu returned call.  Verbal order with readback: propranolol  mg qd starting now, gabapentin 300 mg TID starting tonight, wellbutrin 150 mg every day starting now. HGB and HCT, metabolic panel. 1912  Patient states anxious and she feels tremorous, concerned about not getting her home meds. Patient also c/o nausea. Administering zofran. Provided emotional support and education on home medications. Pain level is much more controlled with naprosyn and percocet. 1920  Bedside and verbal shift change report given to [address] by Manjeet Forte RN. Report included SBAR, kardex, MAR, and recent results.

## 2022-04-28 NOTE — DISCHARGE INSTRUCTIONS
Patient Education        Hip Replacement Surgery (Posterior): What to Expect at Home  Your Recovery  Hip replacement surgery replaces the worn parts of your hip joint. When you leave the hospital, you will probably be walking with crutches or a walker. You may be able to climb a few stairs and get in and out of bed and chairs. But you will need someone to help you at home until you have more energy and can move around better. You will go home with a bandage and stitches, staples, skin glue, or tape strips. You can remove the bandage when your doctor tells you to. If you have stitches or staples, your doctor will remove them about 2 weeks after your surgery. Glue or tape strips will fall off on their own over time. You may still have some mild pain, and the area may be swollen for 3 to 4 months after surgery. Your doctor may give you medicine for the pain. You will continue the rehabilitation program (rehab) you started in the hospital. The better you do with your rehab exercises, the sooner you will get your strength and movement back. Most people are able to return to work 4 weeks to 4 months after surgery. This care sheet gives you a general idea about how long it will take for you to recover. But each person recovers at a different pace. Follow the steps below to get better as quickly as possible. How can you care for yourself at home? Activity    · Your doctor may not want your affected leg to cross the center of your body toward the other leg. If so, your therapist may suggest these ideas:  ? Do not cross your legs. ? Be very careful as you get in or out of bed or a car so your leg does not cross the imaginary line in the middle of your body.     · Go slowly when you climb stairs. Make sure the lights are on. Have someone watch you, if you can. When you climb stairs:  ? Step up first with your unaffected leg. Then bring the affected leg up to the same step. Bring your crutches or cane up.   ? To go down stairs, reverse the order. First, put your crutches or cane on the lower step. Then bring the affected leg down to that step. Finally, step down with the unaffected leg.     · You can ride in a car, but stop at least once every hour to get out and walk around.     · You may want to sleep on your back. Don't reach down too far to pull up blankets when you lie in bed.     · If your doctor recommends exercises, do them as directed. You can cut back on your exercises if your muscles start to ache, but don't stop doing them.     · Rest when you feel tired. You may take a nap, but don't stay in bed all day.     · Work with your physical therapist to learn the best way to exercise. You will probably have to use a walker, crutches, or a cane for at least 4 to 6 weeks.     · Your doctor may advise you to stay away from activities that put stress on the joint. This includes sports such as tennis, football, and jogging.     · Try not to sit for too long at one time. You will feel less stiff if you take a short walk about every hour. When you sit, use chairs with arms, and don't sit in low chairs.     · Do not bend over more than 90 degrees (like the angle in a letter \"L\").     · Sleep on your back with your legs slightly apart or on your side with a pillow between your knees for about 6 weeks or as your doctor tells you. Do not sleep on your stomach or affected leg.     · Ask your doctor when you can drive again.     · Most people are able to return to work 4 weeks to 4 months after surgery.     · Ask your doctor when it is okay for you to have sex. Diet    · By the time you leave the hospital, you will probably be eating your normal diet. Your doctor may recommend that you take iron and vitamin supplements.     · Drink plenty of fluids (unless your doctor tells you not to).   · Eat healthy foods, and watch your portion sizes. Try to stay at your ideal weight.  Too much weight puts more stress on your new hip joint.     · You may notice that your bowel movements are not regular right after your surgery. This is common. Try to avoid constipation and straining with bowel movements. You may want to take a fiber supplement every day. If you have not had a bowel movement after a couple of days, ask your doctor about taking a mild laxative. Medicines    · Your doctor will tell you if and when you can restart your medicines. You will also get instructions about taking any new medicines.     · If you take aspirin or some other blood thinner, ask your doctor if and when to start taking it again. Make sure that you understand exactly what your doctor wants you to do.     · Your doctor may give you a blood-thinning medicine to prevent blood clots. If you take a blood thinner, be sure you get instructions about how to take your medicine safely. Blood thinners can cause serious bleeding problems. This medicine could be in pill form or as a shot (injection). If a shot is necessary, your doctor will tell you how to do this.     · Be safe with medicines. Take pain medicines exactly as directed. ? If the doctor gave you a prescription medicine for pain, take it as prescribed. ? If you are not taking a prescription pain medicine, ask your doctor if you can take an over-the-counter medicine.     · If you think your pain medicine is making you sick to your stomach:  ? Take your medicine after meals (unless your doctor has told you not to). ? Ask your doctor for a different pain medicine.     · If your doctor prescribed antibiotics, take them as directed. Do not stop taking them just because you feel better. You need to take the full course of antibiotics. Incision care    · If your doctor told you how to care for your cut (incision), follow your doctor's instructions. You will have a dressing over the cut. A dressing helps the incision heal and protects it.  Your doctor will tell you how to take care of this.     · If you did not get instructions, follow this general advice:  ? If you have strips of tape on the cut the doctor made, leave the tape on for a week or until it falls off.  ? If you have stitches or staples, your doctor will tell you when to come back to have them removed. ? If you have skin glue on the cut, leave it on until it falls off. Skin glue is also called skin adhesive or liquid stitches. ? Change the bandage every day. ? Wash the area daily with warm water, and pat it dry. Don't use hydrogen peroxide or alcohol. They can slow healing. ? You may cover the area with a gauze bandage if it oozes fluid or rubs against clothing. ? You may shower 24 to 48 hours after surgery. Pat the incision dry. Don't swim or take a bath for the first 2 weeks, or until your doctor tells you it is okay. Exercise    · Your physical therapist will teach you exercises to do at home. Always do them as your therapist tells you.     · Avoid activities where you might fall. Ice and elevation    · For pain, put ice or a cold pack on the area for 10 to 20 minutes at a time. Put a thin cloth between the ice and your skin. If your doctor recommended cold therapy using a portable machine, follow the instructions that came with the machine.     · Your ankle may swell for about 3 months. Prop up your ankle when you ice it or anytime you sit or lie down. Try to keep it above the level of your heart. This will help reduce swelling. Other instructions    · Wear compression stockings if your doctor told you to. These may help to prevent blood clots. Your doctor will tell you how long you need to keep wearing the compression stockings.     · Try to prevent falls. To avoid falling:  ? Arrange furniture so that you will not trip on it. ? Get rid of throw rugs, and move electrical cords out of the way. ? Walk only in areas with plenty of light. ? Put grab bars in showers and bathtubs. ? Try to avoid icy or snowy sidewalks.  Choose shoes with good traction, or consider using traction devices that attach to your shoes. ? Wear shoes with sturdy, flat soles. Follow-up care is a key part of your treatment and safety. Be sure to make and go to all appointments, and call your doctor if you are having problems. It's also a good idea to know your test results and keep a list of the medicines you take. When should you call for help? Call 911 anytime you think you may need emergency care. For example, call if:    · You passed out (lost consciousness).     · You have severe trouble breathing.     · You have sudden chest pain and shortness of breath, or you cough up blood. Call your doctor now or seek immediate medical care if:    · You have signs that your hip may be dislocated, including:  ? Severe pain and not being able to stand. ? A crooked leg that looks like your hip is out of position. ? Not being able to bend or straighten your leg.     · Your leg or foot is cool or pale or changes color.     · You cannot feel or move your leg.     · You have signs of a blood clot, such as:  ? Pain in your calf, back of the knee, thigh, or groin. ? Redness and swelling in your leg or groin.     · Your incision comes open and begins to bleed, or the bleeding increases.     · You feel like your heart is racing or beating irregularly.     · You have signs of infection, such as:  ? Increased pain, swelling, warmth, or redness. ? Red streaks leading from the incision. ? Pus draining from the incision. ? A fever. Watch closely for changes in your health, and be sure to contact your doctor if:    · You do not have a bowel movement after taking a laxative.     · You do not get better as expected. Where can you learn more? Go to http://www.gray.com/  Enter Q746 in the search box to learn more about \"Hip Replacement Surgery (Posterior): What to Expect at Home. \"  Current as of: July 1, 2021               Content Version: 13.2  © 8229-6109 [employer]. Care instructions adapted under license by [employer] (which disclaims liability or warranty for this information). If you have questions about a medical condition or this instruction, always ask your healthcare professional. [address] any warranty or liability for your use of this information.

## 2022-04-29 ENCOUNTER — HOME HEALTH ADMISSION (OUTPATIENT)
Dept: HOME HEALTH SERVICES | Facility: HOME HEALTH | Age: 57
End: 2022-04-29
Payer: OTHER GOVERNMENT

## 2022-04-29 VITALS
RESPIRATION RATE: 19 BRPM | BODY MASS INDEX: 38.64 KG/M2 | SYSTOLIC BLOOD PRESSURE: 129 MMHG | WEIGHT: 231.9 LBS | TEMPERATURE: 98.2 F | DIASTOLIC BLOOD PRESSURE: 64 MMHG | OXYGEN SATURATION: 99 % | HEART RATE: 90 BPM | HEIGHT: 65 IN

## 2022-04-29 PROCEDURE — 74011250637 HC RX REV CODE- 250/637: Performed by: ORTHOPAEDIC SURGERY

## 2022-04-29 PROCEDURE — 97116 GAIT TRAINING THERAPY: CPT

## 2022-04-29 PROCEDURE — 97535 SELF CARE MNGMENT TRAINING: CPT

## 2022-04-29 RX ADMIN — OXYCODONE AND ACETAMINOPHEN 1 TABLET: 5; 325 TABLET ORAL at 06:54

## 2022-04-29 RX ADMIN — NAPROXEN 500 MG: 250 TABLET ORAL at 08:13

## 2022-04-29 RX ADMIN — GABAPENTIN 300 MG: 300 CAPSULE ORAL at 08:13

## 2022-04-29 RX ADMIN — ASPIRIN 325 MG: 325 TABLET, COATED ORAL at 08:13

## 2022-04-29 RX ADMIN — Medication 1 TABLET: at 08:13

## 2022-04-29 NOTE — ROUTINE PROCESS
Bedside and Verbal shift change report given to Stephen Villegas RN by Heidi Nowak RN. Report included the following information SBAR and Kardex.

## 2022-04-29 NOTE — PROGRESS NOTES
Problem: Falls - Risk of  Goal: *Absence of Falls  Description: Document Olya Oh Fall Risk and appropriate interventions in the flowsheet.   Outcome: Resolved/Met  Note: Fall Risk Interventions:  Mobility Interventions: Utilize walker, cane, or other assistive device         Medication Interventions: Patient to call before getting OOB    Elimination Interventions: Call light in reach,Bed/chair exit alarm              Problem: Patient Education: Go to Patient Education Activity  Goal: Patient/Family Education  Outcome: Resolved/Met     Problem: Patient Education: Go to Patient Education Activity  Goal: Patient/Family Education  Outcome: Resolved/Met     Problem: Patient Education: Go to Patient Education Activity  Goal: Patient/Family Education  Outcome: Resolved/Met     Problem: Pain  Goal: *Control of Pain  Outcome: Resolved/Met     Problem: Hypertension  Goal: *Blood pressure within specified parameters  Outcome: Resolved/Met  Goal: *Fluid volume balance  Outcome: Resolved/Met  Goal: *Labs within defined limits  Outcome: Resolved/Met     Problem: Patient Education: Go to Patient Education Activity  Goal: Patient/Family Education  Outcome: Resolved/Met     Problem: Patient Education: Go to Patient Education Activity  Goal: Patient/Family Education  Outcome: Resolved/Met     Problem: Hip Replacement: Day of Surgery/Unit  Goal: Off Pathway (Use only if patient is Off Pathway)  Outcome: Resolved/Met  Goal: Activity/Safety  Outcome: Resolved/Met  Goal: Consults, if ordered  Outcome: Resolved/Met  Goal: Diagnostic Test/Procedures  Outcome: Resolved/Met  Goal: Nutrition/Diet  Outcome: Resolved/Met  Goal: Medications  Outcome: Resolved/Met  Goal: Respiratory  Outcome: Resolved/Met  Goal: Treatments/Interventions/Procedures  Outcome: Resolved/Met  Goal: Psychosocial  Outcome: Resolved/Met  Goal: *Initiate mobility  Outcome: Resolved/Met  Goal: *Optimal pain control at patient's stated goal  Outcome: Resolved/Met  Goal: *Hemodynamically stable  Outcome: Resolved/Met     Problem: Hip Replacement: Post Op Day 1  Goal: Off Pathway (Use only if patient is Off Pathway)  Outcome: Resolved/Met  Goal: Activity/Safety  Outcome: Resolved/Met  Goal: Diagnostic Test/Procedures  Outcome: Resolved/Met  Goal: Nutrition/Diet  Outcome: Resolved/Met  Goal: Medications  Outcome: Resolved/Met  Goal: Respiratory  Outcome: Resolved/Met  Goal: Treatments/Interventions/Procedures  Outcome: Resolved/Met  Goal: Psychosocial  Outcome: Resolved/Met  Goal: Discharge Planning  Outcome: Resolved/Met  Goal: *Demonstrates progressive activity  Outcome: Resolved/Met  Goal: *Optimal pain control at patient's stated goal  Outcome: Resolved/Met  Goal: *Hemodynamically stable  Outcome: Resolved/Met  Goal: *Discharge plan identified  Outcome: Resolved/Met     Problem: Hip Replacement: Post-Op Day 2  Goal: Off Pathway (Use only if patient is Off Pathway)  Outcome: Resolved/Met  Goal: Activity/Safety  Outcome: Resolved/Met  Goal: Diagnostic Test/Procedures  Outcome: Resolved/Met  Goal: Nutrition/Diet  Outcome: Resolved/Met  Goal: Medications  Outcome: Resolved/Met  Goal: Respiratory  Outcome: Resolved/Met  Goal: Treatments/Interventions/Procedures  Outcome: Resolved/Met  Goal: Psychosocial  Outcome: Resolved/Met  Goal: *Met physical therapy criteria for discharge to the next level of care  Outcome: Resolved/Met  Goal: *Optimal pain control with oral analgesia  Outcome: Resolved/Met  Goal: *Hemodynamically stable  Outcome: Resolved/Met  Goal: *Tolerating diet  Outcome: Resolved/Met  Goal: *Verbalizes understanding of any indicated hip precautions  Outcome: Resolved/Met  Goal: *Patient verbalizes understanding of discharge instructions  Outcome: Resolved/Met

## 2022-04-29 NOTE — PROGRESS NOTES
0047-Resting quietly in bed. Dressing to right anterior hip, intact, no drainage. Ice pack in place; positive sensation to right lower extremity. Incentive spirometer in use. Call light and personal items within reach. Assessment complete. 0412-Stable. Assisted out of bed to bedside commode with walker in place. 0420-Assisted back to bed, resting quietly. 0630-Resting quietly in bed. Stable. Call light and personal items within reach. Stable during shift. Verbal shift change report given to Juvencio Torres RN (oncoming nurse) by Abdon Loya RN (offgoing nurse). Report included the following information SBAR and Kardex.

## 2022-04-29 NOTE — PROGRESS NOTES
Problem: Mobility Impaired (Adult and Pediatric)  Goal: *Acute Goals and Plan of Care (Insert Text)  Description: Physical Therapy Goals  Initiated 4/27/2022  to be met within 3-7 days  STG's:  1. Bed mobility:  Supine to/from sit with S in prep for ADL activity. 2. Transfers:  Sit to/from stand with S/RW in prep for gait. LTG's  1. Ambulation:  Ambulate 150 ft.with S/RW for home mobility at discharge. 2. Step Negotiation: Ascend/Descend flight of steps with CGA with HR for home navigation as indicated. 3. Patient Education:  S/Independent with HEP for home performance accurately at discharge. Outcome: Progressing Towards Goal   [x]  Patient has met MD mobilization crimarla for d/c home   []  Recommend HH with 24 hour adult care   []  Benefit from additional acute PT session to address:      PHYSICAL THERAPY TREATMENT    Patient: Padmaja Patrick (57 y.o. female)  Date: 4/29/2022  Diagnosis: Unilateral primary osteoarthritis, right hip [M16.11] <principal problem not specified>  Procedure(s) (LRB):  RIGHT TOTAL HIP ARTHROPLASTY W/C-ARM (Right) 2 Days Post-Op  Precautions: Fall,WBAT (Posterior hip precautions)  PLOF:     ASSESSMENT:  Pt ambulating in the anguiano with the ortho educator, took over and pt ambulated to he stair well. Negotiated 1 steps holding (B) hand rails. Ambulated back to room to practice bed mobility. Pt able to independently get herself in and out of bed this session. Pt transferred to chair. Safe to d/c home today. Progression toward goals:   []      Improving appropriately and progressing toward goals  [x]      Improving slowly and progressing toward goals  []      Not making progress toward goals and plan of care will be adjusted     PLAN:  Patient continues to benefit from skilled intervention to address the above impairments. Continue treatment per established plan of care.   Discharge Recommendations:  Home Health  Further Equipment Recommendations for Discharge:  rolling walker     SUBJECTIVE:   Patient stated I actually got some sleep last night.     OBJECTIVE DATA SUMMARY:   Critical Behavior:  Neurologic State: Alert,Appropriate for age,Eyes open spontaneously  Orientation Level: Appropriate for age  Cognition: Follows commands,Appropriate for age attention/concentration,Appropriate safety awareness  Safety/Judgement: Fall prevention  Functional Mobility Training:  Bed Mobility:    Supine to Sit: Supervision  Sit to Supine: Supervision  Scooting: Supervision  Transfers:  Sit to Stand: Supervision  Stand to Sit: Supervision  Balance:  Sitting: Intact  Standing: Intact;Pull to stand   Ambulation/Gait Training:  Distance (ft): 70 Feet (ft)  Assistive Device: Gait belt;Walker, rolling  Gait Abnormalities: Step to gait  Speed/Marian: Slow  Stairs:  Number of Stairs Trained: 1  Stairs - Level of Assistance: Supervision  Rail Use: Both        Pain:  Pain level pre-treatment: 0/10  Pain level post-treatment: 0/10   Pain Intervention(s): Medication (see MAR); Rest, Ice, Repositioning   Response to intervention: Nurse notified, See doc flow    Activity Tolerance:   Fair+  Please refer to the flowsheet for vital signs taken during this treatment. After treatment:   [x] Patient left in no apparent distress sitting up in chair  [] Patient left in no apparent distress in bed  [x] Call bell left within reach  [x] Nursing notified  [] Caregiver present  [] Bed alarm activated  [] SCDs applied      COMMUNICATION/EDUCATION:   [x]         Role of Physical Therapy in the acute care setting. [x]         Fall prevention education was provided and the patient/caregiver indicated understanding. [x]         Patient/family have participated as able in working toward goals and plan of care. [x]         Patient/family agree to work toward stated goals and plan of care. []         Patient understands intent and goals of therapy, but is neutral about his/her participation.   []         Patient is unable to participate in stated goals/plan of care: ongoing with therapy staff.  []         Other:        Adriel Huber, PTA   Time Calculation: 13 mins

## 2022-04-29 NOTE — PROGRESS NOTES
2001 - Head to toe assessment completed at this time. Pt rate pain 5/10. No distress noted. 18G Left FA IVF infusing as ordered. SCDs in place bilaterally as ordered. Incentive spirometer encouraged. Call bell within reach. Will continue to monitor.

## 2022-04-29 NOTE — ROUTINE PROCESS
Bedside and Verbal shift change report given to Finley Cooks, RN (oncoming nurse) by Napoleon Lam RN (offgoing nurse). Report included the following information SBAR and Kardex.

## 2022-04-29 NOTE — PROGRESS NOTES
Pt AxO3. AVS reviewed with pt and pt verbalized understanding. All pt's belongings sent home with pt. piv removed, 2x2 applied to site, site wnl. Pt wheeled down to private car.

## 2022-04-29 NOTE — PROGRESS NOTES
Problem: Self Care Deficits Care Plan (Adult)  Goal: *Acute Goals and Plan of Care (Insert Text)  Description: Occupational Therapy Goals  Initiated 4/27/2022 within 7 day(s). 1.  Patient will perform grooming with modified independence standing at sink for 5 minutes or more. 2.  Patient will perform lower body dressing with modified independence. 3.  Patient will perform toilet transfers with modified independence. 4.  Patient will perform all aspects of toileting with modified independence. 5.  Patient will utilize energy conservation techniques during functional activities with verbal, visual, and tactile cues. Outcome: Progressing Towards Goal   OCCUPATIONAL THERAPY TREATMENT    Patient: Rosie Hayes (7 y.o. female)  Date: 4/29/2022  Diagnosis: Unilateral primary osteoarthritis, right hip [M16.11] <principal problem not specified>  Procedure(s) (LRB):  RIGHT TOTAL HIP ARTHROPLASTY W/C-ARM (Right) 2 Days Post-Op  Precautions: Fall,WBAT (posterior hip)  PLOF: pt mod I for ADLs/functional mobility    Chart, occupational therapy assessment, plan of care, and goals were reviewed. ASSESSMENT:  Pt found seated in chair, reporting pain 6/10, agreeable to therapy. Pt able to verbalize proper posterior hip precautions 3/3. Educated pt on adaptive strategies for lower body ADLs, and clothing modifications for increased independence. Provided/educated on use of hip kit for increased independence. Pt able to don B socks with setup/SBA. Provided opportunity for pt to voice questions on ADL performance when home, pt has no further concerns. Spouse/daughter present during session for education on home safety. Pt left seated in chair, call bell/phone within reach.    Progression toward goals:  [x]          Improving appropriately and progressing toward goals  []          Improving slowly and progressing toward goals  []          Not making progress toward goals and plan of care will be adjusted     PLAN:  Patient continues to benefit from skilled intervention to address the above impairments. Continue treatment per established plan of care. Discharge Recommendations:  Home Health  Further Equipment Recommendations for Discharge:  N/A     SUBJECTIVE:   Patient stated i'm doing alright.     OBJECTIVE DATA SUMMARY:   Cognitive/Behavioral Status:  Neurologic State: Alert  Orientation Level: Oriented X4  Cognition: Follows commands,Appropriate decision making,Appropriate for age attention/concentration  Safety/Judgement: Awareness of environment    Functional Mobility and Transfers for ADLs:   Bed Mobility:  Supine to Sit: Supervision  Sit to Supine: Supervision  Scooting: Supervision   Transfers:  Sit to Stand: Supervision    Balance:  Sitting: Intact  Standing: Intact;Pull to stand    ADL Intervention:  Lower Body Dressing Assistance  Dressing Assistance: Set-up; Stand-by assistance  Socks: Set-up; Stand-by assistance  Leg Crossed Method Used: No  Position Performed: Seated in chair  Cues: Verbal cues provided;Visual cues provided  Adaptive Equipment Used: Sock aid    Cognitive Retraining  Safety/Judgement: Awareness of environment    Pain:  Pain level pre-treatment: 6/10   Pain level post-treatment: 6/10  Pain Intervention(s): Rest, Ice, Repositioning   Response to intervention: Nurse notified, See doc flow sheet    Activity Tolerance:    Fair. Pt limited by pain, strength, ROM    Please refer to the flowsheet for vital signs taken during this treatment. After treatment:   [x]  Patient left in no apparent distress sitting up in chair  []  Patient left in no apparent distress in bed  [x]  Call bell left within reach  [x]  Nursing notified  [x]  Caregiver present  []  Bed alarm activated    COMMUNICATION/EDUCATION:   [x] Role of Occupational Therapy in the acute care setting  [x] Home safety education was provided and the patient/caregiver indicated understanding.   [x] Patient/family have participated as able in working towards goals and plan of care. [x] Patient/family agree to work toward stated goals and plan of care. [] Patient understands intent and goals of therapy, but is neutral about his/her participation. [] Patient is unable to participate in goal setting and plan of care.       Thank you for this referral.  Keesha Delgado, OTR/L  Time Calculation: 14 mins

## 2022-04-30 ENCOUNTER — HOME CARE VISIT (OUTPATIENT)
Dept: SCHEDULING | Facility: HOME HEALTH | Age: 57
End: 2022-04-30
Payer: OTHER GOVERNMENT

## 2022-04-30 VITALS
DIASTOLIC BLOOD PRESSURE: 80 MMHG | OXYGEN SATURATION: 97 % | RESPIRATION RATE: 18 BRPM | SYSTOLIC BLOOD PRESSURE: 120 MMHG | TEMPERATURE: 97.8 F | HEART RATE: 82 BPM

## 2022-04-30 PROCEDURE — 3331090001 HH PPS REVENUE CREDIT

## 2022-04-30 PROCEDURE — 400013 HH SOC

## 2022-04-30 PROCEDURE — G0151 HHCP-SERV OF PT,EA 15 MIN: HCPCS

## 2022-04-30 PROCEDURE — 400018 HH-NO PAY CLAIM PROCEDURE

## 2022-04-30 PROCEDURE — 3331090002 HH PPS REVENUE DEBIT

## 2022-04-30 NOTE — Clinical Note
Thank you  ----- Message -----  From: Brittney Smith, PT  Sent: 5/2/2022   4:59 PM EDT  To: Patrice Tinajero, PTA      1w1   3w2  THR with hip restriction by Dr. Mesfin Hahn.  Lives with spouse in a 2 level house, has >10 steps to go to 2nd floor bedroom but currently stays on the 1st floor of house at this time

## 2022-04-30 NOTE — HOME HEALTH
Skilled services/Home bound verification:     Skilled Reason for admission/summary of clinical condition:  R THR by Dr. Lonnie Mota  This patient is homebound for the following reasons Requires considerable and taxing effort to leave the home . Caregiver: spouse. Caregiver assists with IADL's. Medications reconciled and all medications are available in the home this visit. High risk medication teaching regarding anticoagulants, hyperglycemic agents or opiod narcotics performed (specify) percocet for risk of overdose  Dr. Lonnie Mota notified of any discrepancies/look a like medications/medication interactions no severe interaction noted . Home health supplies by type and quantity ordered/delivered this visit include: none  Patient education provided this visit to include: HEP, fall prevention, dc planning . Patient level of understanding of education provided: Patient was able to partially teach back HEP   Sharps Education Provided: n/a  Patient response to procedure performed:  patient needed verbal cues for HEP   Home exercise program/Homework provided: patient was educated with HEP including supine ankle pumps, ankle circles, quad sets, heel slides and gluteal sets x 20 reps x 3 sets daily to improve ROM and MMT on RLE to improve gait and transfers followed by ice x 20 minutes at a time on R hip to decrease pain and swelling. Patient educated with fall prevention technique by decluttering space, proper use of AD and footwear  Pt/Caregiver instructed on plan of care and are agreeable to plan of care at this time. Physician Dr. Lonnie Mota notified of patient admission to home health and plan of care including anticipated frequency of 1w1 3w2  for PT   Discharge planning discussed with patient and caregiver. Discharge planning as follows: dc to family with MD supervision when all goals are met . Pt/Caregiver did verbalize understanding of discharge planning.    Next MD appointment 2 weeks   Patient/caregiver encouraged/instructed to keep appointment as lack of follow through with physician appointment could result in discontinuation of home care services for non-compliance. PMHx : Unilateral primary osteoarthritis, right hip 04/27/2022    Severe obesity ([address] 09/09/2019    Lumbar radiculopathy 07/12/2017    Lumbar degenerative disc disease 07/12/2017    Spondylosis of lumbar region without myelopathy or radiculopathy 07/12/2017    Chronic pain syndrome 07/12/2017    Cervicalgia 07/12/2017    Cervical facet syndrome   S: pain on R hip 5/10 that is managed by rest and pain medication as needed and ice x 20 minutes at a time   O:Patients Goals= Be able to go back to PLOF  Wound/Incision: location, description, drainage: has intact ABD pad dressing on R hip without drainage present, to be removed by MD on next visit   PLOF: Lives with  in a 2 level house, prior to surgery, she was independent with ADL's and IADL's with difficulty due to chronic pain on R hip and was not using any AD for gait and was able to drive  STRENGTH R hip flexor 2+/5, R knee flexor and extensor 3-/5. LLE wfl  BALANCE  Tinetti 11/28 high fall risk due to reduced strength on LLE, gait deviation and decreased efficiency of balance reactions. Patient demonstrated poor use of hip and ankle strategy during standing balance activity. Patient requires support from AD at all times for safety and stability. GAIT Patient was able to ambulate with RW x 30 steps with antalgic gait on RLE, decreased step length and stride length on RLE with wide TO and slow paced.  patient was educated with heel to toe gait pattern technique to prevent LOB and falls   BED MOBILITY Patient needed Mod  A x1 to lift RLE in and out of bed   TRANSFERS Patient needed Min A x1 with bed, chair and toilet   A: PT evaluation completed with the presence of spouse and daughter who are the primary CG, POC established, Med rec done, HEP established  P:Home Safety eval/recommendations: Home health physical therapy initial evaluation performed. Patient demonstrates decreased strength, balance, and endurance which increases patient's overall fall risk and burden of care. Patient would benefit from home health physical therapy to improve balance, strength, and endurance which would decrease fall risk and allow patient to return to prior level of function once all functional goals or full rehab potential is met. patient was educated with HEP including supine ankle oumps, ankle circles, quad sets, hamstring sets, heel slides and gluteal sets x 20 reps x 3 sets daily to improve ROM and MMT on RLE to improve gait and transfers followed by ice x 20 minutes at a time on R hip to decrease pain and swelling.  Patient educated with fall prevention technique by decluttering space, proper use of AD and footwear

## 2022-04-30 NOTE — Clinical Note
1w1   3w2  THR with hip restriction by Dr. Malaika Vincent.  Lives with spouse in a 2 level house, has >10 steps to go to 2nd floor bedroom but currently stays on the 1st floor of house at this time

## 2022-05-01 PROCEDURE — 3331090001 HH PPS REVENUE CREDIT

## 2022-05-01 PROCEDURE — 3331090002 HH PPS REVENUE DEBIT

## 2022-05-02 ENCOUNTER — TELEPHONE (OUTPATIENT)
Dept: OTHER | Age: 57
End: 2022-05-02

## 2022-05-02 ENCOUNTER — HOME CARE VISIT (OUTPATIENT)
Dept: HOME HEALTH SERVICES | Facility: HOME HEALTH | Age: 57
End: 2022-05-02
Payer: OTHER GOVERNMENT

## 2022-05-02 ENCOUNTER — HOME CARE VISIT (OUTPATIENT)
Dept: SCHEDULING | Facility: HOME HEALTH | Age: 57
End: 2022-05-02
Payer: OTHER GOVERNMENT

## 2022-05-02 VITALS
SYSTOLIC BLOOD PRESSURE: 122 MMHG | HEART RATE: 68 BPM | DIASTOLIC BLOOD PRESSURE: 68 MMHG | TEMPERATURE: 97.4 F | OXYGEN SATURATION: 95 %

## 2022-05-02 PROCEDURE — G0157 HHC PT ASSISTANT EA 15: HCPCS

## 2022-05-02 PROCEDURE — 3331090001 HH PPS REVENUE CREDIT

## 2022-05-02 PROCEDURE — 3331090002 HH PPS REVENUE DEBIT

## 2022-05-02 NOTE — TELEPHONE ENCOUNTER
Post Discharge Phone placed after Joint Replacement surgery   Spoke with Onelia Phan    Pain management reviewed:     Reviewed pain medications. Pain level 6 out of 10. Patient states swelling is manageable. Patient reminded to elevate leg above heart level and use ice to help with swelling and pain relief. Reviewed walking every hour while awake   Home Health has come out Saturday and coming out today. Appetite is fair but increasing. Reviewed the importance of eating  healthy to heal and have energy. Bowels have moved a few times. Patient states dressing is intact without drainage. Denies any unusual symptoms. Follow up appointment is scheduled with surgeon.  Opportunity given for patient to ask questions

## 2022-05-03 PROCEDURE — 3331090002 HH PPS REVENUE DEBIT

## 2022-05-03 PROCEDURE — 3331090001 HH PPS REVENUE CREDIT

## 2022-05-03 NOTE — HOME HEALTH
SUBJECTIVE: \" I just finished taking a mini bath and washing my hair at the sink with my daughter. \" Patient reported no falls. CAREGIVER INVOLVEMENT/ASSISTANCE NEEDED FOR: Daughter and  assist with bathing, meals, and cleaning right now. OBJECTIVE:  See interventions. PATIENT EDUCATION PROVIDED THIS VISIT: Reeducated on importance on amb with FFW during the day to prevent stiffness, importance of performing HEP 3 times a day, using pain meds as directed, and ice for 20mins on and 40 off for pain management. Discussed with  and daughter presnt to slowly add activites each day as tolerated. PATIENT RESPONSE TO EDUCATION PROVIDED: Patient verbalized that she is taking pain meds as directed and that she is drinking water. HEP: Supine APs, HS, QS, and GS X20 reps. Standing heel raises, marching, and ABD X20 reps 3 times a day    PATIENT RESPONSE TO TREATMENT: No increase in pain verbalized. Improved yodit with amb. ASSESSMENT OF PROGRESS TOWARD GOALS: (I) with bed mobilty with effort to lift LE onto the bed. Improved yodit with amb using FFW. Patient is still limited by pain level and weakness in the (R) hip and LE. PLAN FOR NEXT VISIT: Increase amb distance and start practicing on the steps. THE FOLLOWING DISCHARGE PLANNING WAS DISCUSSED WITH THE PATIENT/CAREGIVER: Reviewed all goals and frequency with patient. Patient is on target to be discharged at the end of the 7 visits.

## 2022-05-04 ENCOUNTER — HOME CARE VISIT (OUTPATIENT)
Dept: SCHEDULING | Facility: HOME HEALTH | Age: 57
End: 2022-05-04
Payer: OTHER GOVERNMENT

## 2022-05-04 PROCEDURE — G0157 HHC PT ASSISTANT EA 15: HCPCS

## 2022-05-04 PROCEDURE — 3331090001 HH PPS REVENUE CREDIT

## 2022-05-04 PROCEDURE — 3331090002 HH PPS REVENUE DEBIT

## 2022-05-05 VITALS
HEART RATE: 65 BPM | DIASTOLIC BLOOD PRESSURE: 69 MMHG | SYSTOLIC BLOOD PRESSURE: 121 MMHG | OXYGEN SATURATION: 98 % | TEMPERATURE: 98.4 F

## 2022-05-05 PROCEDURE — 3331090001 HH PPS REVENUE CREDIT

## 2022-05-05 PROCEDURE — 3331090002 HH PPS REVENUE DEBIT

## 2022-05-05 NOTE — HOME HEALTH
S: Pt denies falls or changes in medication, c/o 4/10 pain in right hip   CAREGIVER PARTICIPATION:  Pt's daughter is assisting with ADLs Meals, Pt is managing medocation   O: TRANSFERS: Ind sit to from edge of bed and dining chair  THEREX/HEP: Pt instructed in supine ankle pumps, quad sets, glut sets, heel slides, SAQ x 15 reps x 1 set, toe taps on step on right, alternating toe taps x 10 reps x 1 set pt able to return demonstrate HEP   BED MOBIILTY: CGA provided for supine<>sit transfers   GAIT: Pt amb 250ft with FWW outside on uneven surfaces pt amb with decreaed hip flexion    STAIRS: Pt negotiates front step, 2 back steps to exit home with FWW/Sup  VCs for walker placement and sequencing pt able to return demonstrate safely    EDUCATION: Pt instructed to perform HEP 3x day, ice x 20 minutes every waking hour, instructed in s/s of infection and hip precautions and fall prevention   A: RESPONSE TO EDUCATION: Pt able to return demonstrate HEP and was able to teach back s/s of infection, hip precautions and fall prevention   RESPONSE TO TX: Pt tolerated increaed amb dist outside on unevn surfaces c/o burnng in right hip, able to negotiate stairs safely c/o fatigue RPE of 5 following tx session    A: Patient requires skilled PT for instruction in strengthening, balance and coordination to improve strength and facilitate increased independence with functional mobility. P: Cont with PT 4 visits remaining for strengthening, gait tng, endurance and balance.  Anticipated DC 5/13/2022 (0) indicator not present

## 2022-05-06 ENCOUNTER — HOME CARE VISIT (OUTPATIENT)
Dept: SCHEDULING | Facility: HOME HEALTH | Age: 57
End: 2022-05-06
Payer: OTHER GOVERNMENT

## 2022-05-06 PROCEDURE — 3331090002 HH PPS REVENUE DEBIT

## 2022-05-06 PROCEDURE — G0157 HHC PT ASSISTANT EA 15: HCPCS

## 2022-05-06 PROCEDURE — 3331090001 HH PPS REVENUE CREDIT

## 2022-05-07 PROCEDURE — 3331090001 HH PPS REVENUE CREDIT

## 2022-05-07 PROCEDURE — 3331090002 HH PPS REVENUE DEBIT

## 2022-05-08 PROCEDURE — 3331090002 HH PPS REVENUE DEBIT

## 2022-05-08 PROCEDURE — 3331090001 HH PPS REVENUE CREDIT

## 2022-05-09 ENCOUNTER — HOME CARE VISIT (OUTPATIENT)
Dept: SCHEDULING | Facility: HOME HEALTH | Age: 57
End: 2022-05-09
Payer: OTHER GOVERNMENT

## 2022-05-09 PROCEDURE — 3331090002 HH PPS REVENUE DEBIT

## 2022-05-09 PROCEDURE — G0157 HHC PT ASSISTANT EA 15: HCPCS

## 2022-05-09 PROCEDURE — 3331090001 HH PPS REVENUE CREDIT

## 2022-05-10 VITALS
TEMPERATURE: 98.8 F | DIASTOLIC BLOOD PRESSURE: 79 MMHG | SYSTOLIC BLOOD PRESSURE: 138 MMHG | OXYGEN SATURATION: 97 % | TEMPERATURE: 98.2 F | SYSTOLIC BLOOD PRESSURE: 122 MMHG | HEART RATE: 68 BPM | HEART RATE: 66 BPM | OXYGEN SATURATION: 98 % | DIASTOLIC BLOOD PRESSURE: 73 MMHG

## 2022-05-10 PROCEDURE — 3331090002 HH PPS REVENUE DEBIT

## 2022-05-10 PROCEDURE — 3331090001 HH PPS REVENUE CREDIT

## 2022-05-11 ENCOUNTER — HOME CARE VISIT (OUTPATIENT)
Dept: SCHEDULING | Facility: HOME HEALTH | Age: 57
End: 2022-05-11
Payer: OTHER GOVERNMENT

## 2022-05-11 VITALS
OXYGEN SATURATION: 98 % | TEMPERATURE: 98 F | SYSTOLIC BLOOD PRESSURE: 137 MMHG | DIASTOLIC BLOOD PRESSURE: 70 MMHG | HEART RATE: 70 BPM

## 2022-05-11 PROCEDURE — G0157 HHC PT ASSISTANT EA 15: HCPCS

## 2022-05-11 PROCEDURE — 3331090002 HH PPS REVENUE DEBIT

## 2022-05-11 PROCEDURE — 3331090001 HH PPS REVENUE CREDIT

## 2022-05-11 NOTE — HOME HEALTH
S: Pt denies falls or changes in medication, c/o 6/10 pain in right hip, states she was unable to perform HEP yesterday secondary to nerve pain. CAREGIVER PARTICIPATION:  Pt's daughter is assisting with ADLs Meals  O: TRANSFERS: Ind transfers from multiple surfaces   THEREX/HEP: Pt intructed in standing heel toe raises, hip abduction, HS curls, marching in place x 10, seated knee ext reps pt able to return demonstrate  GAIT: gait tng on even surfaces x150ft FWW/Sup on even surfaces pt amb with good stride length     EDUCATION: Pt instructed to perform HEP 3x day, ice x 20 minutes every waking hour, reviewed s/s of infection, hip precautions and fall prevention   A: RESPONSE TO EDUCATION: Pt able to return demonstrate HEP and was able to teach back s/s of infection, hip precautions and fall prevention   RESPONSE TO TX: Poor pain management, pt unable to peform more than 10 reps of ex due to pain, states she is frustrated and has reached out to surgeon regarding pain. Patient requires skilled PT for instruction in strengthening, balance and coordination to improve strength and facilitate increased independence with functional mobility. P: Cont with PT 3 visits remaining for strengthening, gait tng, endurance and balance.  Anticipated DC 5/13/2022

## 2022-05-11 NOTE — HOME HEALTH
S: Pt denies falls or changes in medication, pt states she has not been able to perform HEP more than once a day due to pain level   CAREGIVER PARTICIPATION:  Daughter/Spouse is assisting patient with ADLs  FU with surgeon 5/12/2022  O: TRANSFERS: Mod I sit to stand trasnfers from dining chair    THEREX/HEP: Pt instructed in standing heel toe rasies, hip abduction, marching in place, hip flexion, HS curls x 15 reps   BALANCE: Tinetti 21/28  BED MOBIILTY: Ind supine<>sit transfers   GAIT: Pt progressed to [facility] amb x 200ft VCs for cane placement and sequencing pt able to return demonstrate safely amb with slow yodit and wide base of support  EDUCATION: Pt instructed to perform HEP 3x day, ice x 20 minutes every waking hour, instructed in complications associted with use of opiods and pain management  A: RESPONSE TO EDUCATION: Pt able to return demonstrate HEP, pt able to teach back s/s of infection, pain mangaement and complications associated with use of opiods   RESPONSE TO TX: Improved participation today, slight improvement in pain level, improved balance Tinetti increased by 10 points  Patient requires skilled PT for instruction in strengthening, balance and coordination to improve strength and facilitate increased independence with functional mobility. P: Cont with PT 3 visits remaining will continue to address strengthen, gait tng, endurance and balance.  Anticipated DC 5/13/2022

## 2022-05-11 NOTE — HOME HEALTH
S: Pt denies falls or changes in medication, c/o 4/10 pain in right hip, received call back from surgeon he will address pain at follow appt on thrusday  CAREGIVER PARTICIPATION:  Daughter/Spouse is assisting patient with ADLs  O: TRANSFERS: Ind transfers from multiple surfaces in home   THEREX/HEP: Pt instructed in standing heel toe rasies, hip abduction, marching in place, hip flexion, hip extension, HS curls x 15 reps x 1 set pt able to return demonstrate  BED MOBIILTY: Ind bed mobility  STAIRS: Stair negotiation tng pt negotiates 1 flight of stairs with rail and SPC VCs for sequening pt negotiates safely  GAIT: Pt progressed to SPC amb x 350ft outside on uneven surfaces pt amb with decreased yodit, wide base of support, good stride length Supervision provided   EDUCATION: Pt instructed to perform HEP 3x day, reviewed pain management    A: RESPONSE TO EDUCATION: Pt able to return demonstrate HEP, pt able to teach back pain management    RESPONSE TO TX: Pt demonstrates improved izzy for gait and therex today, able to safely manage 1 flight of stairs all goals resolved with exception of pain management goal, which MD will address at FU  P: DC next visit

## 2022-05-12 PROCEDURE — 3331090001 HH PPS REVENUE CREDIT

## 2022-05-12 PROCEDURE — 3331090002 HH PPS REVENUE DEBIT

## 2022-05-13 ENCOUNTER — HOME CARE VISIT (OUTPATIENT)
Dept: SCHEDULING | Facility: HOME HEALTH | Age: 57
End: 2022-05-13
Payer: OTHER GOVERNMENT

## 2022-05-13 VITALS
RESPIRATION RATE: 18 BRPM | OXYGEN SATURATION: 98 % | DIASTOLIC BLOOD PRESSURE: 80 MMHG | SYSTOLIC BLOOD PRESSURE: 120 MMHG | HEART RATE: 98 BPM | TEMPERATURE: 97.6 F

## 2022-05-13 PROCEDURE — 3331090002 HH PPS REVENUE DEBIT

## 2022-05-13 PROCEDURE — G0151 HHCP-SERV OF PT,EA 15 MIN: HCPCS

## 2022-05-13 PROCEDURE — 3331090001 HH PPS REVENUE CREDIT

## 2022-05-14 PROCEDURE — 3331090001 HH PPS REVENUE CREDIT

## 2022-05-14 PROCEDURE — 3331090002 HH PPS REVENUE DEBIT

## 2022-05-14 NOTE — HOME HEALTH
S: Patient continues to complain of pain on R hip 4-7/10 that limites her ability to function independently and walk without pain   O:Patients Goals= Be able to go back to PLOF  Wound/Incision: location, description, drainage: R hip intact steri strip without signs and symptoms of infection noted   STRENGTH R hip flexion 3+/5, R knee 4-/5  BALANCE  Tinetti 19/28 high fall risk due to reduced strength on RLE, gait deviation and decreased efficiency of balance reactions. Patient demonstrated poor use of hip and ankle strategy during standing balance activity. Patient requires support from AD at all times for safety and stability. GAIT Patient ambulated with SPC x 50 feet with antalgic gait on RLE, demonstrated inconsistencies with heel to toe gait pattern technique and impaired ability to use SPC. Patient was educated with proper technique with use of SPC and needed visual cues to complete task   TUG 28 seconds  30 sec chair stand test: unable to tolerate due to patient still requires arm push up from chair   BED MOBILITY Patient needed supervision with bed mobility and needed verbal cues with proper positioning when on the bed   TRANSFERS Patient needed supervision with verbal cues for safe technique. patient unable to tolerate tub transfer and shower transfer at this time due to complaint of feeling unsteady   A: PT evaluation completed with the presence of daughter. Called MD for patient progress. Patient continues to remain homebound secondary to painful ambulation, impaired ability for balance as evidenced by Tinetti score of 19/28 from previous 11/28. Patient requires supervision with bed mobility and transfers and demonstrated inconsistencies with use of SPC during ambulation therefore making her at risk for falls. Dr. Angely Uribe ordered additional physical therapy services to further assist patient in increasing strength on RLE and improve ability for ambulation.   P:PT to continue 2x a week for 6 weeks to work on gait training with [facility] progressing to no AD, transfers training, strengthening of RLE and to improve balance

## 2022-05-15 PROCEDURE — 3331090001 HH PPS REVENUE CREDIT

## 2022-05-15 PROCEDURE — 3331090002 HH PPS REVENUE DEBIT

## 2022-05-16 PROCEDURE — 3331090002 HH PPS REVENUE DEBIT

## 2022-05-16 PROCEDURE — 3331090001 HH PPS REVENUE CREDIT

## 2022-05-17 ENCOUNTER — HOME CARE VISIT (OUTPATIENT)
Dept: HOME HEALTH SERVICES | Facility: HOME HEALTH | Age: 57
End: 2022-05-17
Payer: OTHER GOVERNMENT

## 2022-05-17 PROCEDURE — 3331090002 HH PPS REVENUE DEBIT

## 2022-05-17 PROCEDURE — 3331090001 HH PPS REVENUE CREDIT

## 2022-05-18 ENCOUNTER — HOME CARE VISIT (OUTPATIENT)
Dept: SCHEDULING | Facility: HOME HEALTH | Age: 57
End: 2022-05-18
Payer: OTHER GOVERNMENT

## 2022-05-18 VITALS
OXYGEN SATURATION: 95 % | DIASTOLIC BLOOD PRESSURE: 59 MMHG | SYSTOLIC BLOOD PRESSURE: 145 MMHG | TEMPERATURE: 98.2 F | HEART RATE: 62 BPM

## 2022-05-18 PROCEDURE — 3331090002 HH PPS REVENUE DEBIT

## 2022-05-18 PROCEDURE — 3331090001 HH PPS REVENUE CREDIT

## 2022-05-18 PROCEDURE — G0157 HHC PT ASSISTANT EA 15: HCPCS

## 2022-05-18 NOTE — HOME HEALTH
S: Pt denies falls or changes in medication, c/o 510 pain in right hip, pt reports steri strips have fallen off  CAREGIVER PARTICIPATION: Spouse is assisting patient with ADLs  O: TRANSFERS: Ind transfers from multiple surfaces in home   THEREX/HEP: Pt instructed in standing heel toe rasies, hip abduction, marching in place, hip flexion, HS curls x 15 reps x 1 set pt able to return demonstrate   STAIRS: Stair negotiates single step to ext home with use of SPC/CGA VCs for sequencing and cane placment pt able to return demonstrate safe stair negotiation   GAIT: Gait tng outside on uneven surfaces with SPCx 350ft with SUP VCs with 1 standing rest period needed VCs to improve cane placment, to improve stride length and decreae wt bearing on [facility]    EDUCATION: Pt instructed to perform HEP 3x day, reviewed pain management, fall prevention and hip precautions   A: RESPONSE TO EDUCATION: Pt able to return demonstrate HEP, pt able to teach back pain management and hip precautions   RESPONSE TO TX: Improved participation today, decreased pain reported today however increaesd to 6/10 following tx session RPE 6  P: Will continue to address balance, endurance, strength and gait to allow pt to return to prior level of function

## 2022-05-19 PROCEDURE — 3331090001 HH PPS REVENUE CREDIT

## 2022-05-19 PROCEDURE — 3331090002 HH PPS REVENUE DEBIT

## 2022-05-20 ENCOUNTER — HOME CARE VISIT (OUTPATIENT)
Dept: SCHEDULING | Facility: HOME HEALTH | Age: 57
End: 2022-05-20
Payer: OTHER GOVERNMENT

## 2022-05-20 PROCEDURE — 3331090001 HH PPS REVENUE CREDIT

## 2022-05-20 PROCEDURE — G0157 HHC PT ASSISTANT EA 15: HCPCS

## 2022-05-20 PROCEDURE — 3331090002 HH PPS REVENUE DEBIT

## 2022-05-21 PROCEDURE — 3331090001 HH PPS REVENUE CREDIT

## 2022-05-21 PROCEDURE — 3331090002 HH PPS REVENUE DEBIT

## 2022-05-22 VITALS
OXYGEN SATURATION: 96 % | HEART RATE: 61 BPM | SYSTOLIC BLOOD PRESSURE: 131 MMHG | TEMPERATURE: 97.4 F | DIASTOLIC BLOOD PRESSURE: 76 MMHG

## 2022-05-22 PROCEDURE — 3331090001 HH PPS REVENUE CREDIT

## 2022-05-22 PROCEDURE — 3331090002 HH PPS REVENUE DEBIT

## 2022-05-22 NOTE — HOME HEALTH
S: Pt denies falls or changes in medication, c/o 510 pain in right hip,   CAREGIVER PARTICIPATION: Spouse is assisting patient with ADLs, and house cleaning  O: TRANSFERS: Ind transfers from multiple surfaces in home   THEREX/HEP: Pt instructed in standing heel toe rasies, hip abduction, marching in place, HS curls x 15 reps x 1 set, marching with FWW for UE support, pt able to return demonstrate  TINETTI/TUG: Tinetti increased score to 22, TUG score of 18    STAIRS: Stair negotiates single step to ext home with use of FWW today due to pain/fatigue, with correct sequencing    GAIT: Gait tng outside on uneven  x 250ft x 2, VC's for even step length and heel strike, able to demonstrate correctly, 2 standing rest period needed  to improve stride length    EDUCATION: Pt instructed to perform HEP 3x day, reviewed pain management, fall prevention and hip precautions   A: RESPONSE TO EDUCATION: Pt able to return demonstrate HEP, however states non compliant with performing HEP 3x day, pt able to teach back pain management and hip precautions   RESPONSE TO TX: PT c/o constant pain, RPE of 5 following gait, pt tolerated all exercises and gait with minimal rest  P: Will continue to address balance, endurance, strength and gait to allow pt to return to prior level of function

## 2022-05-23 PROCEDURE — 3331090002 HH PPS REVENUE DEBIT

## 2022-05-23 PROCEDURE — 3331090001 HH PPS REVENUE CREDIT

## 2022-05-24 ENCOUNTER — HOME CARE VISIT (OUTPATIENT)
Dept: HOME HEALTH SERVICES | Facility: HOME HEALTH | Age: 57
End: 2022-05-24
Payer: OTHER GOVERNMENT

## 2022-05-24 PROCEDURE — 3331090001 HH PPS REVENUE CREDIT

## 2022-05-24 PROCEDURE — 3331090002 HH PPS REVENUE DEBIT

## 2022-05-25 ENCOUNTER — HOME CARE VISIT (OUTPATIENT)
Dept: SCHEDULING | Facility: HOME HEALTH | Age: 57
End: 2022-05-25
Payer: OTHER GOVERNMENT

## 2022-05-25 PROCEDURE — 3331090001 HH PPS REVENUE CREDIT

## 2022-05-25 PROCEDURE — 3331090002 HH PPS REVENUE DEBIT

## 2022-05-25 PROCEDURE — G0157 HHC PT ASSISTANT EA 15: HCPCS

## 2022-05-26 ENCOUNTER — HOME CARE VISIT (OUTPATIENT)
Dept: SCHEDULING | Facility: HOME HEALTH | Age: 57
End: 2022-05-26
Payer: OTHER GOVERNMENT

## 2022-05-26 VITALS
DIASTOLIC BLOOD PRESSURE: 78 MMHG | HEART RATE: 81 BPM | SYSTOLIC BLOOD PRESSURE: 131 MMHG | OXYGEN SATURATION: 97 % | TEMPERATURE: 98.1 F

## 2022-05-26 PROCEDURE — G0157 HHC PT ASSISTANT EA 15: HCPCS

## 2022-05-26 PROCEDURE — 3331090001 HH PPS REVENUE CREDIT

## 2022-05-26 PROCEDURE — 3331090002 HH PPS REVENUE DEBIT

## 2022-05-26 NOTE — HOME HEALTH
S: Pt states\" I am better today than yesterday\", reports twisting leg when tripping over a blanket on the floor  yesterday, reports no falls    CAREGIVER PARTICIPATION: Pt preparing meals, Ind with ADL's at sink, Spouse is assisting patient with house cleaning chores. O: TRANSFERS: Ind transfers from couch and kitchen chair without use of arm rest.     THEREX/HEP:Pt instructed in standing heel toe rasies, hip abduction, marching in place, HS curls, Marching,  x 15 reps. Added mini squats with counter for UE support, tactile cues to perform squat correctly x 12 reps, added toe taps x 12 bilateral on first step of staircare. pt able to return demonstrate    STAIRS: Step up/ down 1 step, with rail x 10 reps for strengthening and reinforce correct sequencing pattern. GAIT: Gait tng inside with SPC around obstacles. VC\"S for increased floor clearance and improved heel strike while ambulating on carpet.      EDUCATION: Pt instructed to perform HEP 3x day, reviewed pain management including to increase ice time as neded to manage pain, fall prevention with focus on rugs and obstacles on floor, and hip precautions     RESPONSE TO EDUCATION: Pt able to return demonstrate HEP with use of handout as visual guide, pt able to teach back pain management and hip precautions     A: RESPONSE TO TX: pt tolerated all exercises, voiced no increase in pain,     Pt continues to require skilled PT for instruction in balance, endurance, strength and gait to facilitate Ind with functional mobility and allow pt to return to prior level of function  P: Cont PT services 2x week to work toward goals

## 2022-05-27 PROCEDURE — 3331090002 HH PPS REVENUE DEBIT

## 2022-05-27 PROCEDURE — 3331090001 HH PPS REVENUE CREDIT

## 2022-05-28 PROCEDURE — 3331090001 HH PPS REVENUE CREDIT

## 2022-05-28 PROCEDURE — 3331090002 HH PPS REVENUE DEBIT

## 2022-05-29 PROCEDURE — 3331090001 HH PPS REVENUE CREDIT

## 2022-05-29 PROCEDURE — 3331090002 HH PPS REVENUE DEBIT

## 2022-05-30 PROCEDURE — 3331090002 HH PPS REVENUE DEBIT

## 2022-05-30 PROCEDURE — 3331090001 HH PPS REVENUE CREDIT

## 2022-05-30 PROCEDURE — 400018 HH-NO PAY CLAIM PROCEDURE

## 2022-05-31 VITALS
DIASTOLIC BLOOD PRESSURE: 85 MMHG | TEMPERATURE: 98.2 F | OXYGEN SATURATION: 98 % | SYSTOLIC BLOOD PRESSURE: 149 MMHG | HEART RATE: 67 BPM

## 2022-05-31 PROCEDURE — 3331090002 HH PPS REVENUE DEBIT

## 2022-05-31 PROCEDURE — 3331090001 HH PPS REVENUE CREDIT

## 2022-05-31 NOTE — HOME HEALTH
S: Pt denies falls or changes in medication, c/o 410 pain in right hip  States she has been experiencing increased anxiety and depressed since her daughter left to return home  CAREGIVER PARTICIPATION: Pt is Ind with ADLs, able to prepare meals and manage medications   O: TRANSFERS: Ind transfers from multiple surfaces in home   THEREX/HEP: Pt instructed in standing heel toe rasies, hip abduction, marching in place, HS curls x 15 reps x 1 set, toe taps on step, alternating step ups pt able to return demostrate HEP safely  STAIRS: Pt negotiates 1 flight of stairs with SPC/Sup good sequencing safely   GAIT: Gait tng outside on uneven  400ft with SPC VC's for even step length and to avoid excess wt on cane demonstrates good sequencing 1 standing rest peroid needed     EDUCATION: Pt instructed to perform HEP 3x day, reviewed pain management, fall prevention and hip precautions pt also instructed to contact her PCP or Mental Health MD to discuss increased depression  A: RESPONSE TO EDUCATION: Pt able to return demonstrate HEP, able to reach back fall prevention and hip precautions.     RESPONSE TO TX: Pt toleratred tx without increase in pain reported, complains of minimal fatigue following tx session   P: Will continue to address balance, endurance, strength and gait to allow pt to return to prior level of function

## 2022-06-01 ENCOUNTER — HOME CARE VISIT (OUTPATIENT)
Dept: SCHEDULING | Facility: HOME HEALTH | Age: 57
End: 2022-06-01
Payer: OTHER GOVERNMENT

## 2022-06-01 PROCEDURE — 3331090002 HH PPS REVENUE DEBIT

## 2022-06-01 PROCEDURE — G0157 HHC PT ASSISTANT EA 15: HCPCS

## 2022-06-01 PROCEDURE — 400013 HH SOC

## 2022-06-01 PROCEDURE — 3331090001 HH PPS REVENUE CREDIT

## 2022-06-02 VITALS
DIASTOLIC BLOOD PRESSURE: 85 MMHG | HEART RATE: 68 BPM | OXYGEN SATURATION: 98 % | SYSTOLIC BLOOD PRESSURE: 145 MMHG | TEMPERATURE: 98.1 F

## 2022-06-02 PROCEDURE — 3331090002 HH PPS REVENUE DEBIT

## 2022-06-02 PROCEDURE — 3331090001 HH PPS REVENUE CREDIT

## 2022-06-02 NOTE — HOME HEALTH
S: Pt denies changes in medications or fall, c/o 4/10 right hip pain  Pt states level of anxiety and depressing has not changed, she has not reached out to her MD states they will want her to come in and she cannot drive nor does she have transportation  CAREGIVER PARTICIPATION: Pt is Ind with ADLs, able to prepare meals and manage medications   O: TRANSFERS: Ind transfers from multiple surfaces in home  BED MOBILITY: Ind bed mobility   THEREX/HEP: Pt instructed in supine ankle pumps, quad set, glut sets, SAQ, LAQ x 15 reps x 1 set, adductor stretch x 15 secs x 2 reps  STAIRS: Mod I stair negotiation x 1 flight   GAIT: Gait tng on even surfaces x 6 minutes slow yodit, increaed wt on [facility]      EDUCATION: Pt instructed to perform HEP 3x day, reviewed pain management, fall prevention and hip precautions, pt instructed to contact the MD she see's for anxiety/depression  A: RESPONSE TO EDUCATION: Pt able to return demonstrate HEP, able to teach back fall prevention, hip precautions and pain mangagement   RESPONSE TO TX: Pt c/o 5/10 pain following tx session  P: Will continue to address balance, endurance, strength and gait to allow pt to return to prior level of function

## 2022-06-03 ENCOUNTER — HOME CARE VISIT (OUTPATIENT)
Dept: SCHEDULING | Facility: HOME HEALTH | Age: 57
End: 2022-06-03
Payer: OTHER GOVERNMENT

## 2022-06-03 PROCEDURE — 3331090002 HH PPS REVENUE DEBIT

## 2022-06-03 PROCEDURE — G0157 HHC PT ASSISTANT EA 15: HCPCS

## 2022-06-03 PROCEDURE — 3331090001 HH PPS REVENUE CREDIT

## 2022-06-04 VITALS
HEART RATE: 64 BPM | OXYGEN SATURATION: 98 % | SYSTOLIC BLOOD PRESSURE: 129 MMHG | TEMPERATURE: 98 F | DIASTOLIC BLOOD PRESSURE: 76 MMHG

## 2022-06-04 PROCEDURE — 3331090002 HH PPS REVENUE DEBIT

## 2022-06-04 PROCEDURE — 3331090001 HH PPS REVENUE CREDIT

## 2022-06-04 NOTE — HOME HEALTH
S: Pt denies changes in medications or fall, c/o 4/10 right hip pain pt states she is still having pain with shifting onto involved LE and is unable to step up onto RLE  CAREGIVER PARTICIPATION: Pt is Ind with ADLs, able to prepare meals and manage medications   O: TRANSFERS: Ind transfers from multiple surfaces in home  BED MOBILITY: Ind bed mobility   THEREX/HEP: Pt instructed in standing heel toe raises, hip abduction, mini squats, HS curls, marching in place x 15 reps pt is unable to tolerate 20 reps, seated knee ext x 15 reps x 1 set  STAIRS: Mod I stair negotiation x 1 flight with rail/SPC  GAIT: Gait tng on even surfaces x 300ft x 2 slow yodit, increaed wt bearing on cane  EDUCATION: Pt instructed to gradually increase reps of therex to include stepping up onto RLE continue use of ice to manage pain   A: RESPONSE TO EDUCATION: Pt able to return demonstrate HEP pt understands instructions for increased reps to improve strength  RESPONSE TO TX: reports increased pain 5/10 following tx session, amb with decreased hip flexion on right with increased wt bearing on cane states she does not think she is well enough for discharge and would like to continue per MDs orders   P: Will continue to address balance, endurance, strength and gait to allow pt to return to prior level of function

## 2022-06-05 PROCEDURE — 3331090001 HH PPS REVENUE CREDIT

## 2022-06-05 PROCEDURE — 3331090002 HH PPS REVENUE DEBIT

## 2022-06-06 PROCEDURE — 3331090002 HH PPS REVENUE DEBIT

## 2022-06-06 PROCEDURE — 3331090001 HH PPS REVENUE CREDIT

## 2022-06-07 ENCOUNTER — HOME CARE VISIT (OUTPATIENT)
Dept: SCHEDULING | Facility: HOME HEALTH | Age: 57
End: 2022-06-07
Payer: OTHER GOVERNMENT

## 2022-06-07 PROCEDURE — G0157 HHC PT ASSISTANT EA 15: HCPCS

## 2022-06-07 PROCEDURE — 3331090002 HH PPS REVENUE DEBIT

## 2022-06-07 PROCEDURE — 3331090001 HH PPS REVENUE CREDIT

## 2022-06-08 VITALS
TEMPERATURE: 98.3 F | HEART RATE: 65 BPM | DIASTOLIC BLOOD PRESSURE: 82 MMHG | OXYGEN SATURATION: 95 % | SYSTOLIC BLOOD PRESSURE: 150 MMHG

## 2022-06-08 PROCEDURE — 3331090001 HH PPS REVENUE CREDIT

## 2022-06-08 PROCEDURE — 3331090002 HH PPS REVENUE DEBIT

## 2022-06-09 ENCOUNTER — HOME CARE VISIT (OUTPATIENT)
Dept: SCHEDULING | Facility: HOME HEALTH | Age: 57
End: 2022-06-09
Payer: OTHER GOVERNMENT

## 2022-06-09 VITALS
TEMPERATURE: 97.6 F | SYSTOLIC BLOOD PRESSURE: 145 MMHG | HEART RATE: 78 BPM | DIASTOLIC BLOOD PRESSURE: 85 MMHG | RESPIRATION RATE: 18 BRPM | OXYGEN SATURATION: 97 %

## 2022-06-09 PROCEDURE — 3331090002 HH PPS REVENUE DEBIT

## 2022-06-09 PROCEDURE — 3331090001 HH PPS REVENUE CREDIT

## 2022-06-09 PROCEDURE — G0151 HHCP-SERV OF PT,EA 15 MIN: HCPCS

## 2022-06-09 NOTE — HOME HEALTH
SUBJECTIVE pain on R hip 4/10 that is managed by rest and occasional use of ice and oain medication as needed   CAREGIVER ASSISTANCE:spouse who lives with patient will be able to assist at night time   MEDICATIONS RECONCILED AND UPDATED: no changes in medications at this time  WOUND R hip healed well, no drainage, redness noted   BED MOBILITY Patient was able to demonstate independence with bed mobility   TRANSFERS Patient requires supervision with transfers and verbal cues for proper foot placement using SPC. Patient also needs verbal cues for maintaining hip precaution   GAIT TRAINING Patient tolerated gait using SPC with focus on heel to toe gait pattern, Demonstrated antalgic gait on RLE with decreased step height and stride length   STAIRS Patient needed supervision with stair negotiation using SPC  BALANCE Tinetti 19/22 high fall risk due to reduced strength on BLE, gait deviation and decreased efficiency of balance reactions. Patient demonstrated poor use of hip and ankle strategy during standing balance activity. Patient requires support from AD at all times for safety and stability. PATIENT/CAREGIVER EDUCATION THIS VISIT: fall prevention, safety, need for assistance as needed for transfers and gait  ASSESSMENT: patient was able to tolerate PT session this visit and was able to show improvement with bed mobility, transfers and gait as evidenced by being able to demonstrate independence with bed mobility, supervision with transfers and SBA with gait using SPC and stair negotiation.  Needed verbal cues to maintain hip precaution   PLAN Continue PT for higher balance activities using SPC   DISCHARGE PLANNING DISCUSSED: dc to self and family under MD supervision when all goals are met

## 2022-06-09 NOTE — HOME HEALTH
S: Pt denies changes in medications or fall, c/o 4/10 right hip pain pt states she is still having pain with shifting onto involved LE and is unable to step up onto RLE  CAREGIVER PARTICIPATION: Pt is Ind with ADLs, able to prepare meals and manage medications   O: TRANSFERS: Ind transfers from multiple surfaces in home  BED MOBILITY: Ind bed mobility   THEREX/HEP: Pt instructed in standing heel toe raises, hip abduction, mini squats, HS curls, marching in place x 15 reps, side stepping length of counter, retro walking, toe taps on step   STAIIRS: Mod I stair negotiation x 1 flight with rail/SPC  GAIT: Gait tng on even surfaces x 500ft on uneven surfaces focused on increasing yodit and decreasing lateral lean onto cane minimal improvement   EDUCATION: Pt instructed to gradually increase reps of therex to include stepping up onto RLE continue use of ice to manage pain   A: RESPONSE TO EDUCATION: Pt able to return demonstrate HEP   RESPONSE TO TX: Pt has progressed well continues to have discomfort/pain in RLE deficits it quality of gait   P: Will continue to address balance, endurance, strength and gait to allow pt to return to prior level of function, pt scheduled for sup visit

## 2022-06-10 PROCEDURE — 3331090001 HH PPS REVENUE CREDIT

## 2022-06-10 PROCEDURE — 3331090002 HH PPS REVENUE DEBIT

## 2022-06-11 PROCEDURE — 3331090002 HH PPS REVENUE DEBIT

## 2022-06-11 PROCEDURE — 3331090001 HH PPS REVENUE CREDIT

## 2022-06-12 PROCEDURE — 3331090002 HH PPS REVENUE DEBIT

## 2022-06-12 PROCEDURE — 3331090001 HH PPS REVENUE CREDIT

## 2022-06-13 PROCEDURE — 3331090001 HH PPS REVENUE CREDIT

## 2022-06-13 PROCEDURE — 3331090002 HH PPS REVENUE DEBIT

## 2022-06-14 ENCOUNTER — HOME CARE VISIT (OUTPATIENT)
Dept: SCHEDULING | Facility: HOME HEALTH | Age: 57
End: 2022-06-14
Payer: OTHER GOVERNMENT

## 2022-06-14 PROCEDURE — 3331090001 HH PPS REVENUE CREDIT

## 2022-06-14 PROCEDURE — G0157 HHC PT ASSISTANT EA 15: HCPCS

## 2022-06-14 PROCEDURE — 3331090002 HH PPS REVENUE DEBIT

## 2022-06-15 VITALS
SYSTOLIC BLOOD PRESSURE: 145 MMHG | HEART RATE: 74 BPM | TEMPERATURE: 98.3 F | OXYGEN SATURATION: 98 % | DIASTOLIC BLOOD PRESSURE: 80 MMHG

## 2022-06-15 PROCEDURE — 3331090002 HH PPS REVENUE DEBIT

## 2022-06-15 PROCEDURE — 3331090001 HH PPS REVENUE CREDIT

## 2022-06-15 NOTE — HOME HEALTH
S: Pt denies changes in medications or falls, c/o 5/10 right hip pain   CAREGIVER PARTICIPATION: Pt is Ind with ADLs, able to prepare meals and manage medications   O: TRANSFERS: Ind transfers from multiple surfaces in home  BED MOBILITY: Ind bed mobility   THEREX/HEP: Upgraded HEP supine SLR, hip abduction, heel slides, clams with tband, side lying hip abduction, clams with tband, standing mini squats 10 x 2 sets  STAIIRS: Pt negotiates front step to exit home Mod I with SPC  GAIT: Gait tng outside on uneven with SPC x 500ft dist supervsion decreaed yodit  EDUCATION: Pt instructed to gradually increase reps of therex to include stepping up onto RLE continue use of ice to manage pain   A: RESPONSE TO EDUCATION: Pt able to return demonstrate HEP, able to tech back pain mangement   RESPONSE TO TX: Pt demonstrates decreased hip abductor strength, minimal improvement in quality of gait although pt is able to tolerate fair ambulation distance with minimal increase in pain   P: Will continue to address balance, endurance, strength and gait to allow pt to return to prior level of function, pt scheduled for sup visit

## 2022-06-16 PROCEDURE — 3331090002 HH PPS REVENUE DEBIT

## 2022-06-16 PROCEDURE — 3331090001 HH PPS REVENUE CREDIT

## 2022-06-17 ENCOUNTER — HOME CARE VISIT (OUTPATIENT)
Dept: HOME HEALTH SERVICES | Facility: HOME HEALTH | Age: 57
End: 2022-06-17
Payer: OTHER GOVERNMENT

## 2022-06-17 PROCEDURE — 3331090002 HH PPS REVENUE DEBIT

## 2022-06-17 PROCEDURE — 3331090001 HH PPS REVENUE CREDIT

## 2022-06-17 NOTE — CASE COMMUNICATION
Pt cancelled visit, states she slipped while at FU appt yester and really hurt herself pt states she is back on walker request to resume next week

## 2022-06-18 PROCEDURE — 3331090001 HH PPS REVENUE CREDIT

## 2022-06-18 PROCEDURE — 3331090002 HH PPS REVENUE DEBIT

## 2022-06-19 PROCEDURE — 3331090002 HH PPS REVENUE DEBIT

## 2022-06-19 PROCEDURE — 3331090001 HH PPS REVENUE CREDIT

## 2022-06-20 PROCEDURE — 3331090002 HH PPS REVENUE DEBIT

## 2022-06-20 PROCEDURE — 3331090001 HH PPS REVENUE CREDIT

## 2022-06-21 ENCOUNTER — HOME CARE VISIT (OUTPATIENT)
Dept: SCHEDULING | Facility: HOME HEALTH | Age: 57
End: 2022-06-21
Payer: OTHER GOVERNMENT

## 2022-06-21 PROCEDURE — 3331090001 HH PPS REVENUE CREDIT

## 2022-06-21 PROCEDURE — G0157 HHC PT ASSISTANT EA 15: HCPCS

## 2022-06-21 PROCEDURE — 3331090002 HH PPS REVENUE DEBIT

## 2022-06-22 VITALS
SYSTOLIC BLOOD PRESSURE: 125 MMHG | OXYGEN SATURATION: 97 % | TEMPERATURE: 98.3 F | DIASTOLIC BLOOD PRESSURE: 79 MMHG | HEART RATE: 75 BPM

## 2022-06-22 PROCEDURE — 3331090002 HH PPS REVENUE DEBIT

## 2022-06-22 PROCEDURE — 3331090001 HH PPS REVENUE CREDIT

## 2022-06-22 NOTE — HOME HEALTH
S:  Pt c/o 4/10 right hip pain, states follow up with surgeon went well MD suggested pt continue in outpatient setting   Assessment and Summary of Care:  Patient's current functional status before discharge is as follows  Strength: 4/5 bilateral hips  Bed Mobility: Ind bed mobility  Transfers: Ind transfers from mutliple surfaces in home  Gait/WC mobility: 500ft outside on uneven surfaces   Stairs:  Mod I with SPC step to pattern  Special Tests: TUG 18, TINETTI: 28/28  Recommendations: Recommend pt continue PT in outpatient setting  Pt has been prepared for DC next visit

## 2022-06-23 ENCOUNTER — HOME CARE VISIT (OUTPATIENT)
Dept: SCHEDULING | Facility: HOME HEALTH | Age: 57
End: 2022-06-23
Payer: OTHER GOVERNMENT

## 2022-06-23 VITALS
TEMPERATURE: 97.6 F | RESPIRATION RATE: 18 BRPM | SYSTOLIC BLOOD PRESSURE: 145 MMHG | HEART RATE: 78 BPM | DIASTOLIC BLOOD PRESSURE: 85 MMHG | OXYGEN SATURATION: 97 %

## 2022-06-23 PROCEDURE — 3331090002 HH PPS REVENUE DEBIT

## 2022-06-23 PROCEDURE — 3331090001 HH PPS REVENUE CREDIT

## 2022-06-23 PROCEDURE — G0151 HHCP-SERV OF PT,EA 15 MIN: HCPCS

## 2022-06-23 NOTE — HOME HEALTH
SUBJECTIVE intermittent pain on R hip 2/10 that is managed by rest and medicine     CAREGIVER ASSISTANCE:no assistance needed for ADL's.  will be able to drive patient to out patient PT    MEDICATIONS RECONCILED AND UPDATED: no changes in medications at this time    WOUND R hip incision healed well, no drainage noted, no s sx of infection noted     BED MOBILITY independent from needing Min A     TRANSFERS independent with SPC from needing Min A using RW     GAIT TRAINING independent with SPC  from needing Min A RW    STAIRS independent with SPC from unable at time of dc     THERAPEUTIC EXERCISES independent with HEP     BALANCE Tinetti 24/28 from Tinetti 11/28 at time of admit     PATIENT/CAREGIVER EDUCATION THIS VISIT: fall prevention, safety, need for assistance as needed for transfers and gait    ASSESSMENT: patient provided with skilled PT intervention consisting of graded therapeutic exercises, gait training, balance training, safe transfers training, caregiver education and Home exercise program education. Patient at time of discharge was able to demonstrate independence with bed mobility, transfers and gait with SPC . pt. also noted that she is doing HEP 1x a day.     PLAN DC at this time due to goals are met     DISCHARGE PLANNING DISCUSSED: dc to self and family under MD supervision

## 2022-06-24 PROCEDURE — 3331090001 HH PPS REVENUE CREDIT

## 2022-06-24 PROCEDURE — 3331090002 HH PPS REVENUE DEBIT

## 2022-06-25 PROCEDURE — 3331090002 HH PPS REVENUE DEBIT

## 2022-06-25 PROCEDURE — 3331090001 HH PPS REVENUE CREDIT

## 2023-02-14 DIAGNOSIS — M16.11 UNILATERAL PRIMARY OSTEOARTHRITIS, RIGHT HIP: Primary | ICD-10-CM

## 2023-06-26 ENCOUNTER — OFFICE VISIT (OUTPATIENT)
Age: 58
End: 2023-06-26
Payer: OTHER GOVERNMENT

## 2023-06-26 VITALS — BODY MASS INDEX: 38.15 KG/M2 | TEMPERATURE: 97.5 F | HEIGHT: 65 IN | WEIGHT: 229 LBS

## 2023-06-26 DIAGNOSIS — M18.11 ARTHRITIS OF CARPOMETACARPAL (CMC) JOINT OF RIGHT THUMB: Primary | ICD-10-CM

## 2023-06-26 DIAGNOSIS — G56.03 SEVERE CARPAL TUNNEL SYNDROME OF BOTH WRISTS: ICD-10-CM

## 2023-06-26 DIAGNOSIS — M18.12 ARTHRITIS OF CARPOMETACARPAL (CMC) JOINT OF LEFT THUMB: ICD-10-CM

## 2023-06-26 PROCEDURE — 99203 OFFICE O/P NEW LOW 30 MIN: CPT | Performed by: ORTHOPAEDIC SURGERY

## 2023-06-26 PROCEDURE — 20605 DRAIN/INJ JOINT/BURSA W/O US: CPT | Performed by: ORTHOPAEDIC SURGERY

## 2023-07-31 ENCOUNTER — OFFICE VISIT (OUTPATIENT)
Age: 58
End: 2023-07-31

## 2023-07-31 VITALS
BODY MASS INDEX: 38.32 KG/M2 | DIASTOLIC BLOOD PRESSURE: 81 MMHG | WEIGHT: 230 LBS | TEMPERATURE: 97 F | SYSTOLIC BLOOD PRESSURE: 133 MMHG | HEIGHT: 65 IN

## 2023-07-31 DIAGNOSIS — Z01.818 PREOP EXAMINATION: Primary | ICD-10-CM

## 2023-07-31 DIAGNOSIS — M18.11 ARTHRITIS OF CARPOMETACARPAL (CMC) JOINT OF RIGHT THUMB: Primary | ICD-10-CM

## 2023-07-31 DIAGNOSIS — M65.311 TRIGGER THUMB OF RIGHT HAND: ICD-10-CM

## 2023-07-31 DIAGNOSIS — G56.01 SEVERE CARPAL TUNNEL SYNDROME, RIGHT: ICD-10-CM

## 2023-07-31 DIAGNOSIS — M18.11 ARTHRITIS OF CARPOMETACARPAL (CMC) JOINT OF RIGHT THUMB: ICD-10-CM

## 2023-07-31 DIAGNOSIS — G56.03 SEVERE CARPAL TUNNEL SYNDROME OF BOTH WRISTS: ICD-10-CM

## 2023-07-31 DIAGNOSIS — M18.0 PRIMARY OSTEOARTHRITIS OF BOTH FIRST CARPOMETACARPAL JOINTS: ICD-10-CM

## 2023-07-31 NOTE — PROGRESS NOTES
Ron Wilde is a 59 y.o. female right handed retiree. Worker's Compensation and legal considerations: none    Chief Complaint   Patient presents with    Hand Pain     Bilateral hand pain     Pain Score:   6    HPI: Patient presents today for follow-up of bilateral thumb base pain and bilateral hand numbness and tingling. She also reports right thumb pain and locking. Initial HPI: Patient presents today with complaints of bilateral thumb base pain. She also reports a history of bilateral hand numbness and tingling. She has recently had EMGs positive for moderate to severe carpal tunnel syndrome. Date of onset:  chronic  Injury: No  Prior Treatment:  Yes: Comment: night braces    ROS: Review of Systems - General ROS: negative except HPI    Past Medical History:   Diagnosis Date    Allergies     High cholesterol     Hypertension before 2012    OA (osteoarthritis)     Psychiatric disorder     depression    Sleep apnea     mild- no machine       Past Surgical History:   Procedure Laterality Date    BREAST SURGERY  1998    reduction    GI      colonoscopy    HEENT  2009    septoplasty    OTHER SURGICAL HISTORY  2014    neck lift, liposuction        Current Outpatient Medications   Medication Sig Dispense Refill    amLODIPine (NORVASC) 5 MG tablet Take 1 tablet by mouth daily      buPROPion (WELLBUTRIN XL) 150 MG extended release tablet Take 1 tablet by mouth daily      butalbital-acetaminophen-caffeine (FIORICET, ESGIC) -40 MG per tablet Take 1 tablet by mouth every 6 hours as needed      diclofenac sodium (VOLTAREN) 1 % GEL Apply 2 g topically 4 times daily      fluticasone (FLONASE) 50 MCG/ACT nasal spray 2 sprays by Nasal route daily      gabapentin (NEURONTIN) 300 MG capsule Take 1 capsule by mouth 4 times daily.       hydroCHLOROthiazide (HYDRODIURIL) 50 MG tablet Take 1 tablet by mouth daily      propranolol (INDERAL XL) 120 MG extended release capsule Take 2 capsules by mouth daily

## 2023-08-08 ENCOUNTER — HOSPITAL ENCOUNTER (OUTPATIENT)
Facility: HOSPITAL | Age: 58
Discharge: HOME | End: 2023-08-11
Payer: OTHER GOVERNMENT

## 2023-08-08 DIAGNOSIS — M18.11 ARTHRITIS OF CARPOMETACARPAL (CMC) JOINT OF RIGHT THUMB: ICD-10-CM

## 2023-08-08 DIAGNOSIS — Z01.818 PREOP EXAMINATION: ICD-10-CM

## 2023-08-08 LAB
ALBUMIN SERPL-MCNC: 4.1 G/DL (ref 3.4–5)
ALBUMIN/GLOB SERPL: 1.2 (ref 0.8–1.7)
ALP SERPL-CCNC: 92 U/L (ref 45–117)
ALT SERPL-CCNC: 32 U/L (ref 13–56)
ANION GAP SERPL CALC-SCNC: 7 MMOL/L (ref 3–18)
AST SERPL-CCNC: 19 U/L (ref 10–38)
BASOPHILS # BLD: 0.1 K/UL (ref 0–0.1)
BASOPHILS NFR BLD: 1 % (ref 0–2)
BILIRUB SERPL-MCNC: 0.4 MG/DL (ref 0.2–1)
BUN SERPL-MCNC: 15 MG/DL (ref 7–18)
BUN/CREAT SERPL: 17 (ref 12–20)
CALCIUM SERPL-MCNC: 9.7 MG/DL (ref 8.5–10.1)
CHLORIDE SERPL-SCNC: 100 MMOL/L (ref 100–111)
CO2 SERPL-SCNC: 29 MMOL/L (ref 21–32)
CREAT SERPL-MCNC: 0.86 MG/DL (ref 0.6–1.3)
DIFFERENTIAL METHOD BLD: ABNORMAL
EKG ATRIAL RATE: 65 BPM
EKG DIAGNOSIS: NORMAL
EKG P AXIS: -12 DEGREES
EKG P-R INTERVAL: 170 MS
EKG Q-T INTERVAL: 416 MS
EKG QRS DURATION: 80 MS
EKG QTC CALCULATION (BAZETT): 432 MS
EKG R AXIS: -8 DEGREES
EKG T AXIS: 38 DEGREES
EKG VENTRICULAR RATE: 65 BPM
EOSINOPHIL # BLD: 0.6 K/UL (ref 0–0.4)
EOSINOPHIL NFR BLD: 7 % (ref 0–5)
ERYTHROCYTE [DISTWIDTH] IN BLOOD BY AUTOMATED COUNT: 12.4 % (ref 11.6–14.5)
GLOBULIN SER CALC-MCNC: 3.3 G/DL (ref 2–4)
GLUCOSE SERPL-MCNC: 105 MG/DL (ref 74–99)
HCT VFR BLD AUTO: 41.9 % (ref 35–45)
HGB BLD-MCNC: 14.6 G/DL (ref 12–16)
IMM GRANULOCYTES # BLD AUTO: 0 K/UL (ref 0–0.04)
IMM GRANULOCYTES NFR BLD AUTO: 0 % (ref 0–0.5)
LYMPHOCYTES # BLD: 2.4 K/UL (ref 0.9–3.6)
LYMPHOCYTES NFR BLD: 30 % (ref 21–52)
MCH RBC QN AUTO: 31.5 PG (ref 24–34)
MCHC RBC AUTO-ENTMCNC: 34.8 G/DL (ref 31–37)
MCV RBC AUTO: 90.5 FL (ref 78–100)
MONOCYTES # BLD: 0.7 K/UL (ref 0.05–1.2)
MONOCYTES NFR BLD: 8 % (ref 3–10)
NEUTS SEG # BLD: 4.4 K/UL (ref 1.8–8)
NEUTS SEG NFR BLD: 54 % (ref 40–73)
NRBC # BLD: 0 K/UL (ref 0–0.01)
NRBC BLD-RTO: 0 PER 100 WBC
PLATELET # BLD AUTO: 248 K/UL (ref 135–420)
PMV BLD AUTO: 10.8 FL (ref 9.2–11.8)
POTASSIUM SERPL-SCNC: 3.4 MMOL/L (ref 3.5–5.5)
PROT SERPL-MCNC: 7.4 G/DL (ref 6.4–8.2)
RBC # BLD AUTO: 4.63 M/UL (ref 4.2–5.3)
SODIUM SERPL-SCNC: 136 MMOL/L (ref 136–145)
WBC # BLD AUTO: 8.2 K/UL (ref 4.6–13.2)

## 2023-08-08 PROCEDURE — 80053 COMPREHEN METABOLIC PANEL: CPT

## 2023-08-08 PROCEDURE — 85025 COMPLETE CBC W/AUTO DIFF WBC: CPT

## 2023-08-08 PROCEDURE — 36415 COLL VENOUS BLD VENIPUNCTURE: CPT

## 2023-08-09 RX ORDER — AMLODIPINE BESYLATE 10 MG/1
10 TABLET ORAL DAILY
COMMUNITY

## 2023-08-09 RX ORDER — ZOLPIDEM TARTRATE 10 MG/1
10 TABLET ORAL
COMMUNITY
Start: 2023-07-04

## 2023-08-09 RX ORDER — M-VIT,TX,IRON,MINS/CALC/FOLIC 27MG-0.4MG
1 TABLET ORAL DAILY
COMMUNITY

## 2023-08-09 RX ORDER — KETOCONAZOLE 20 MG/ML
SHAMPOO TOPICAL AS NEEDED
COMMUNITY
Start: 2023-06-04

## 2023-08-09 RX ORDER — IBUPROFEN 800 MG/1
800 TABLET ORAL 3 TIMES DAILY PRN
Status: ON HOLD | COMMUNITY
Start: 2022-12-09 | End: 2023-09-05 | Stop reason: HOSPADM

## 2023-08-09 NOTE — PERIOP NOTE
Instructions for your surgery at [address] [place] [address] Date:  8/9/2023      Patient's Name:  Sekou Richter           Surgery Date:  08/22/2023              Please enter the main entrance of the hospital and check-in at the  located in the lobby. Once checked in at the , you will take the elevators to the second floor, and report to the waiting room on the left. The room will say Procedure Registration. Do NOT eat or drink anything, including candy, gum, or ice chips after midnight prior to your surgery, unless you have specific instructions from your surgeon or anesthesia provider to do so. Brush your teeth before coming to the hospital. You may swish with water, but do not swallow. No smoking/Vaping/E-Cigarettes 24 hours prior to the day of surgery. No alcohol 24 hours prior to the day of surgery. No recreational drugs for one week prior to the day of surgery. Bring Photo ID, Insurance information, and Co-pay if required on day of surgery. Bring in pertinent legal documents, such as, Medical Power of JOSEPH MEJIAS, DNR, Advance Directive, etc.  Leave all valuables, including money/purse, at home. Remove all jewelry, including ALL body piercings, nail polish, acrylic nails, and makeup (including mascara); no lotions, powders, deodorant, or perfume/cologne/after shave on the skin. Follow instruction for Hibiclens washes and CHG wipes from surgeon's office. Glasses and dentures may be worn to the hospital. They must be removed prior to surgery. Please bring case/container for glasses or dentures. Contact lenses should not be worn on day of surgery. Call your doctor's office if symptoms of a cold or illness develop within 24-48 hours prior to your surgery. 14. AN ADULT (relative or friend 25 years or older) [address].   15. Please make arrangements for a responsible adult (18 years or older) to be with you for 24 hours after your

## 2023-08-31 ENCOUNTER — OFFICE VISIT (OUTPATIENT)
Age: 58
End: 2023-08-31

## 2023-08-31 VITALS
SYSTOLIC BLOOD PRESSURE: 138 MMHG | BODY MASS INDEX: 37.32 KG/M2 | DIASTOLIC BLOOD PRESSURE: 82 MMHG | HEIGHT: 65 IN | WEIGHT: 224 LBS

## 2023-08-31 DIAGNOSIS — M18.11 ARTHRITIS OF CARPOMETACARPAL (CMC) JOINT OF RIGHT THUMB: Primary | ICD-10-CM

## 2023-08-31 DIAGNOSIS — M65.311 TRIGGER THUMB OF RIGHT HAND: ICD-10-CM

## 2023-08-31 DIAGNOSIS — G56.01 SEVERE CARPAL TUNNEL SYNDROME, RIGHT: ICD-10-CM

## 2023-08-31 RX ORDER — SODIUM CHLORIDE 0.9 % (FLUSH) 0.9 %
5-40 SYRINGE (ML) INJECTION PRN
OUTPATIENT
Start: 2023-08-31

## 2023-08-31 RX ORDER — SODIUM CHLORIDE 9 MG/ML
INJECTION, SOLUTION INTRAVENOUS PRN
OUTPATIENT
Start: 2023-08-31

## 2023-08-31 RX ORDER — SODIUM CHLORIDE 0.9 % (FLUSH) 0.9 %
5-40 SYRINGE (ML) INJECTION EVERY 12 HOURS SCHEDULED
OUTPATIENT
Start: 2023-08-31

## 2023-08-31 NOTE — H&P
thumb trapeziectomy and suspension arthroplasty, right carpal tunnel release, and right trigger thumb release. Discussed operative procedure in detail, postoperative convalescence, and answered all questions. Postoperative rehab ordered previously to start after first postop appointment. Return in 18 days (on 9/18/2023) for Postop. Plan was reviewed with patient, who verbalized agreement and understanding of the plan    Note: This note was completed using voice recognition software.   Any typographical/name errors or mistakes are unintentional.

## 2023-08-31 NOTE — DISCHARGE INSTRUCTIONS
Ice and Elevate operative wound/dressing. Begin moving fingers immediately after surgery. Keep dressing clean and dry. Cover for showering. Do not remove dressing. DISCHARGE SUMMARY from Nurse    PATIENT INSTRUCTIONS:    After general anesthesia or intravenous sedation, for 24 hours or while taking prescription Narcotics:  Limit your activities  Do not drive and operate hazardous machinery  Do not make important personal or business decisions  Do  not drink alcoholic beverages  If you have not urinated within 8 hours after discharge, please contact your surgeon on call. Report the following to your surgeon:  Excessive pain, swelling, redness or odor of or around the surgical area  Temperature over 100.5  Nausea and vomiting lasting longer than 4 hours or if unable to take medications  Any signs of decreased circulation or nerve impairment to extremity: change in color, persistent  numbness, tingling, coldness or increase pain  Any questions        These are general instructions for a healthy lifestyle:    No smoking/ No tobacco products/ Avoid exposure to second hand smoke  Surgeon General's Warning:  Quitting smoking now greatly reduces serious risk to your health. Obesity, smoking, and sedentary lifestyle greatly increases your risk for illness    A healthy diet, regular physical exercise & weight monitoring are important for maintaining a healthy lifestyle    You may be retaining fluid if you have a history of heart failure or if you experience any of the following symptoms:  Weight gain of 3 pounds or more overnight or 5 pounds in a week, increased swelling in our hands or feet or shortness of breath while lying flat in bed. Please call your doctor as soon as you notice any of these symptoms; do not wait until your next office visit. The discharge information has been reviewed with the patient. The patient verbalized understanding.   Discharge medications reviewed with the patient

## 2023-09-03 ENCOUNTER — ANESTHESIA EVENT (OUTPATIENT)
Facility: HOSPITAL | Age: 58
End: 2023-09-03
Payer: OTHER GOVERNMENT

## 2023-09-05 ENCOUNTER — HOSPITAL ENCOUNTER (OUTPATIENT)
Facility: HOSPITAL | Age: 58
Setting detail: OUTPATIENT SURGERY
Discharge: HOME | End: 2023-09-05
Attending: ORTHOPAEDIC SURGERY | Admitting: ORTHOPAEDIC SURGERY
Payer: OTHER GOVERNMENT

## 2023-09-05 ENCOUNTER — ANESTHESIA (OUTPATIENT)
Facility: HOSPITAL | Age: 58
End: 2023-09-05
Payer: OTHER GOVERNMENT

## 2023-09-05 VITALS
BODY MASS INDEX: 36.99 KG/M2 | SYSTOLIC BLOOD PRESSURE: 102 MMHG | DIASTOLIC BLOOD PRESSURE: 69 MMHG | WEIGHT: 222 LBS | HEART RATE: 61 BPM | TEMPERATURE: 97.5 F | RESPIRATION RATE: 12 BRPM | OXYGEN SATURATION: 92 % | HEIGHT: 65 IN

## 2023-09-05 DIAGNOSIS — M18.11 ARTHRITIS OF CARPOMETACARPAL (CMC) JOINT OF RIGHT THUMB: Primary | ICD-10-CM

## 2023-09-05 DIAGNOSIS — M65.311 TRIGGER THUMB OF RIGHT HAND: ICD-10-CM

## 2023-09-05 DIAGNOSIS — G56.01 SEVERE CARPAL TUNNEL SYNDROME, RIGHT: ICD-10-CM

## 2023-09-05 PROCEDURE — 6360000002 HC RX W HCPCS: Performed by: ORTHOPAEDIC SURGERY

## 2023-09-05 PROCEDURE — 2580000003 HC RX 258: Performed by: NURSE ANESTHETIST, CERTIFIED REGISTERED

## 2023-09-05 PROCEDURE — 6360000002 HC RX W HCPCS: Performed by: NURSE ANESTHETIST, CERTIFIED REGISTERED

## 2023-09-05 PROCEDURE — 64415 NJX AA&/STRD BRCH PLXS IMG: CPT | Performed by: ANESTHESIOLOGY

## 2023-09-05 PROCEDURE — 3700000000 HC ANESTHESIA ATTENDED CARE: Performed by: ORTHOPAEDIC SURGERY

## 2023-09-05 PROCEDURE — 2500000003 HC RX 250 WO HCPCS: Performed by: NURSE ANESTHETIST, CERTIFIED REGISTERED

## 2023-09-05 PROCEDURE — 3600000002 HC SURGERY LEVEL 2 BASE: Performed by: ORTHOPAEDIC SURGERY

## 2023-09-05 PROCEDURE — 94761 N-INVAS EAR/PLS OXIMETRY MLT: CPT

## 2023-09-05 PROCEDURE — 6370000000 HC RX 637 (ALT 250 FOR IP): Performed by: NURSE ANESTHETIST, CERTIFIED REGISTERED

## 2023-09-05 PROCEDURE — 3700000001 HC ADD 15 MINUTES (ANESTHESIA): Performed by: ORTHOPAEDIC SURGERY

## 2023-09-05 PROCEDURE — 2580000003 HC RX 258: Performed by: ORTHOPAEDIC SURGERY

## 2023-09-05 PROCEDURE — 7100000000 HC PACU RECOVERY - FIRST 15 MIN: Performed by: ORTHOPAEDIC SURGERY

## 2023-09-05 PROCEDURE — 2500000003 HC RX 250 WO HCPCS: Performed by: ORTHOPAEDIC SURGERY

## 2023-09-05 PROCEDURE — 2709999900 HC NON-CHARGEABLE SUPPLY: Performed by: ORTHOPAEDIC SURGERY

## 2023-09-05 PROCEDURE — C9290 INJ, BUPIVACAINE LIPOSOME: HCPCS | Performed by: ORTHOPAEDIC SURGERY

## 2023-09-05 PROCEDURE — 6370000000 HC RX 637 (ALT 250 FOR IP): Performed by: ORTHOPAEDIC SURGERY

## 2023-09-05 PROCEDURE — 7100000001 HC PACU RECOVERY - ADDTL 15 MIN: Performed by: ORTHOPAEDIC SURGERY

## 2023-09-05 PROCEDURE — 6360000002 HC RX W HCPCS

## 2023-09-05 PROCEDURE — 3600000012 HC SURGERY LEVEL 2 ADDTL 15MIN: Performed by: ORTHOPAEDIC SURGERY

## 2023-09-05 PROCEDURE — 6360000002 HC RX W HCPCS: Performed by: ANESTHESIOLOGY

## 2023-09-05 PROCEDURE — 7100000011 HC PHASE II RECOVERY - ADDTL 15 MIN: Performed by: ORTHOPAEDIC SURGERY

## 2023-09-05 PROCEDURE — 2700000000 HC OXYGEN THERAPY PER DAY

## 2023-09-05 PROCEDURE — C1713 ANCHOR/SCREW BN/BN,TIS/BN: HCPCS | Performed by: ORTHOPAEDIC SURGERY

## 2023-09-05 PROCEDURE — 7100000010 HC PHASE II RECOVERY - FIRST 15 MIN: Performed by: ORTHOPAEDIC SURGERY

## 2023-09-05 DEVICE — ANCHOR SUT L8.5MM DIA3.5MM HND WRST FORKED TIP EYELET: Type: IMPLANTABLE DEVICE | Site: WRIST | Status: FUNCTIONAL

## 2023-09-05 DEVICE — KIT FIBERLOCK SUSPENSION IMPLANT: Type: IMPLANTABLE DEVICE | Site: WRIST | Status: FUNCTIONAL

## 2023-09-05 RX ORDER — DEXAMETHASONE SODIUM PHOSPHATE 4 MG/ML
INJECTION, SOLUTION INTRA-ARTICULAR; INTRALESIONAL; INTRAMUSCULAR; INTRAVENOUS; SOFT TISSUE PRN
Status: DISCONTINUED | OUTPATIENT
Start: 2023-09-05 | End: 2023-09-05 | Stop reason: SDUPTHER

## 2023-09-05 RX ORDER — SODIUM CHLORIDE 0.9 % (FLUSH) 0.9 %
5-40 SYRINGE (ML) INJECTION PRN
Status: DISCONTINUED | OUTPATIENT
Start: 2023-09-05 | End: 2023-09-05 | Stop reason: HOSPADM

## 2023-09-05 RX ORDER — FAMOTIDINE 20 MG/1
20 TABLET, FILM COATED ORAL ONCE
Status: COMPLETED | OUTPATIENT
Start: 2023-09-05 | End: 2023-09-05

## 2023-09-05 RX ORDER — ROPIVACAINE HYDROCHLORIDE 5 MG/ML
INJECTION, SOLUTION EPIDURAL; INFILTRATION; PERINEURAL
Status: COMPLETED | OUTPATIENT
Start: 2023-09-05 | End: 2023-09-05

## 2023-09-05 RX ORDER — FENTANYL CITRATE 50 UG/ML
25 INJECTION, SOLUTION INTRAMUSCULAR; INTRAVENOUS EVERY 5 MIN PRN
Status: DISCONTINUED | OUTPATIENT
Start: 2023-09-05 | End: 2023-09-05 | Stop reason: HOSPADM

## 2023-09-05 RX ORDER — SODIUM CHLORIDE 9 MG/ML
INJECTION, SOLUTION INTRAVENOUS PRN
Status: DISCONTINUED | OUTPATIENT
Start: 2023-09-05 | End: 2023-09-05 | Stop reason: HOSPADM

## 2023-09-05 RX ORDER — KETAMINE HCL 50MG/ML(1)
SYRINGE (ML) INTRAVENOUS PRN
Status: DISCONTINUED | OUTPATIENT
Start: 2023-09-05 | End: 2023-09-05 | Stop reason: SDUPTHER

## 2023-09-05 RX ORDER — LORAZEPAM 2 MG/ML
0.5 INJECTION INTRAMUSCULAR ONCE
Status: COMPLETED | OUTPATIENT
Start: 2023-09-05 | End: 2023-09-05

## 2023-09-05 RX ORDER — SODIUM CHLORIDE 0.9 % (FLUSH) 0.9 %
5-40 SYRINGE (ML) INJECTION EVERY 12 HOURS SCHEDULED
Status: DISCONTINUED | OUTPATIENT
Start: 2023-09-05 | End: 2023-09-05 | Stop reason: HOSPADM

## 2023-09-05 RX ORDER — KETOROLAC TROMETHAMINE 15 MG/ML
INJECTION, SOLUTION INTRAMUSCULAR; INTRAVENOUS PRN
Status: DISCONTINUED | OUTPATIENT
Start: 2023-09-05 | End: 2023-09-05 | Stop reason: SDUPTHER

## 2023-09-05 RX ORDER — FENTANYL CITRATE 50 UG/ML
100 INJECTION, SOLUTION INTRAMUSCULAR; INTRAVENOUS ONCE
Status: COMPLETED | OUTPATIENT
Start: 2023-09-05 | End: 2023-09-05

## 2023-09-05 RX ORDER — DIPHENHYDRAMINE HYDROCHLORIDE 50 MG/ML
12.5 INJECTION INTRAMUSCULAR; INTRAVENOUS
Status: DISCONTINUED | OUTPATIENT
Start: 2023-09-05 | End: 2023-09-05 | Stop reason: HOSPADM

## 2023-09-05 RX ORDER — ONDANSETRON 2 MG/ML
INJECTION INTRAMUSCULAR; INTRAVENOUS PRN
Status: DISCONTINUED | OUTPATIENT
Start: 2023-09-05 | End: 2023-09-05 | Stop reason: SDUPTHER

## 2023-09-05 RX ORDER — SODIUM CHLORIDE, SODIUM LACTATE, POTASSIUM CHLORIDE, CALCIUM CHLORIDE 600; 310; 30; 20 MG/100ML; MG/100ML; MG/100ML; MG/100ML
INJECTION, SOLUTION INTRAVENOUS CONTINUOUS
Status: DISCONTINUED | OUTPATIENT
Start: 2023-09-05 | End: 2023-09-05 | Stop reason: HOSPADM

## 2023-09-05 RX ORDER — MEPERIDINE HYDROCHLORIDE 25 MG/ML
12.5 INJECTION INTRAMUSCULAR; INTRAVENOUS; SUBCUTANEOUS EVERY 5 MIN PRN
Status: DISCONTINUED | OUTPATIENT
Start: 2023-09-05 | End: 2023-09-05 | Stop reason: HOSPADM

## 2023-09-05 RX ORDER — PROPOFOL 10 MG/ML
INJECTION, EMULSION INTRAVENOUS PRN
Status: DISCONTINUED | OUTPATIENT
Start: 2023-09-05 | End: 2023-09-05 | Stop reason: SDUPTHER

## 2023-09-05 RX ORDER — OXYCODONE HYDROCHLORIDE AND ACETAMINOPHEN 5; 325 MG/1; MG/1
1 TABLET ORAL ONCE
Status: COMPLETED | OUTPATIENT
Start: 2023-09-05 | End: 2023-09-05

## 2023-09-05 RX ORDER — BUPIVACAINE HYDROCHLORIDE 2.5 MG/ML
INJECTION, SOLUTION EPIDURAL; INFILTRATION; INTRACAUDAL PRN
Status: DISCONTINUED | OUTPATIENT
Start: 2023-09-05 | End: 2023-09-05 | Stop reason: HOSPADM

## 2023-09-05 RX ORDER — ROPIVACAINE HYDROCHLORIDE 5 MG/ML
30 INJECTION, SOLUTION EPIDURAL; INFILTRATION; PERINEURAL ONCE
Status: COMPLETED | OUTPATIENT
Start: 2023-09-05 | End: 2023-09-05

## 2023-09-05 RX ORDER — LIDOCAINE HYDROCHLORIDE 20 MG/ML
INJECTION, SOLUTION EPIDURAL; INFILTRATION; INTRACAUDAL; PERINEURAL PRN
Status: DISCONTINUED | OUTPATIENT
Start: 2023-09-05 | End: 2023-09-05 | Stop reason: SDUPTHER

## 2023-09-05 RX ORDER — LIDOCAINE HYDROCHLORIDE 10 MG/ML
INJECTION, SOLUTION EPIDURAL; INFILTRATION; INTRACAUDAL; PERINEURAL
Status: DISCONTINUED
Start: 2023-09-05 | End: 2023-09-05 | Stop reason: WASHOUT

## 2023-09-05 RX ORDER — ONDANSETRON 2 MG/ML
4 INJECTION INTRAMUSCULAR; INTRAVENOUS
Status: COMPLETED | OUTPATIENT
Start: 2023-09-05 | End: 2023-09-05

## 2023-09-05 RX ORDER — KETOROLAC TROMETHAMINE 15 MG/ML
15 INJECTION, SOLUTION INTRAMUSCULAR; INTRAVENOUS ONCE
Status: COMPLETED | OUTPATIENT
Start: 2023-09-05 | End: 2023-09-05

## 2023-09-05 RX ORDER — MIDAZOLAM HYDROCHLORIDE 2 MG/2ML
2 INJECTION, SOLUTION INTRAMUSCULAR; INTRAVENOUS ONCE
Status: COMPLETED | OUTPATIENT
Start: 2023-09-05 | End: 2023-09-05

## 2023-09-05 RX ORDER — FENTANYL CITRATE 50 UG/ML
INJECTION, SOLUTION INTRAMUSCULAR; INTRAVENOUS PRN
Status: DISCONTINUED | OUTPATIENT
Start: 2023-09-05 | End: 2023-09-05 | Stop reason: SDUPTHER

## 2023-09-05 RX ORDER — PROCHLORPERAZINE EDISYLATE 5 MG/ML
5 INJECTION INTRAMUSCULAR; INTRAVENOUS
Status: DISCONTINUED | OUTPATIENT
Start: 2023-09-05 | End: 2023-09-05 | Stop reason: HOSPADM

## 2023-09-05 RX ORDER — LIDOCAINE HYDROCHLORIDE 10 MG/ML
5 INJECTION, SOLUTION INFILTRATION; PERINEURAL ONCE
Status: DISCONTINUED | OUTPATIENT
Start: 2023-09-05 | End: 2023-09-05 | Stop reason: HOSPADM

## 2023-09-05 RX ORDER — OXYCODONE HYDROCHLORIDE AND ACETAMINOPHEN 5; 325 MG/1; MG/1
1 TABLET ORAL EVERY 6 HOURS PRN
Qty: 12 TABLET | Refills: 0 | Status: SHIPPED | OUTPATIENT
Start: 2023-09-05 | End: 2023-09-08

## 2023-09-05 RX ORDER — EPHEDRINE SULFATE/0.9% NACL/PF 50 MG/5 ML
SYRINGE (ML) INTRAVENOUS PRN
Status: DISCONTINUED | OUTPATIENT
Start: 2023-09-05 | End: 2023-09-05 | Stop reason: SDUPTHER

## 2023-09-05 RX ORDER — ACETAMINOPHEN 325 MG/1
650 TABLET ORAL
Status: DISCONTINUED | OUTPATIENT
Start: 2023-09-05 | End: 2023-09-05 | Stop reason: HOSPADM

## 2023-09-05 RX ORDER — ROPIVACAINE HYDROCHLORIDE 5 MG/ML
INJECTION, SOLUTION EPIDURAL; INFILTRATION; PERINEURAL
Status: COMPLETED
Start: 2023-09-05 | End: 2023-09-05

## 2023-09-05 RX ADMIN — KETOROLAC TROMETHAMINE 15 MG: 15 INJECTION, SOLUTION INTRAMUSCULAR; INTRAVENOUS at 12:20

## 2023-09-05 RX ADMIN — Medication 10 MG: at 10:46

## 2023-09-05 RX ADMIN — HYDROMORPHONE HYDROCHLORIDE 0.5 MG: 1 INJECTION, SOLUTION INTRAMUSCULAR; INTRAVENOUS; SUBCUTANEOUS at 12:02

## 2023-09-05 RX ADMIN — HYDROMORPHONE HYDROCHLORIDE 0.5 MG: 1 INJECTION, SOLUTION INTRAMUSCULAR; INTRAVENOUS; SUBCUTANEOUS at 12:13

## 2023-09-05 RX ADMIN — MIDAZOLAM 2 MG: 1 INJECTION INTRAMUSCULAR; INTRAVENOUS at 09:02

## 2023-09-05 RX ADMIN — SODIUM CHLORIDE, SODIUM LACTATE, POTASSIUM CHLORIDE, AND CALCIUM CHLORIDE: 600; 310; 30; 20 INJECTION, SOLUTION INTRAVENOUS at 09:26

## 2023-09-05 RX ADMIN — ONDANSETRON 4 MG: 2 INJECTION INTRAMUSCULAR; INTRAVENOUS at 10:34

## 2023-09-05 RX ADMIN — HYDROMORPHONE HYDROCHLORIDE 0.5 MG: 1 INJECTION, SOLUTION INTRAMUSCULAR; INTRAVENOUS; SUBCUTANEOUS at 11:46

## 2023-09-05 RX ADMIN — LIDOCAINE HYDROCHLORIDE 40 MG: 20 INJECTION, SOLUTION EPIDURAL; INFILTRATION; INTRACAUDAL; PERINEURAL at 09:26

## 2023-09-05 RX ADMIN — Medication 10 MG: at 10:13

## 2023-09-05 RX ADMIN — FAMOTIDINE 20 MG: 20 TABLET, FILM COATED ORAL at 08:45

## 2023-09-05 RX ADMIN — Medication 25 MG: at 09:52

## 2023-09-05 RX ADMIN — ROPIVACAINE HYDROCHLORIDE 30 ML: 5 INJECTION EPIDURAL; INFILTRATION; PERINEURAL at 09:09

## 2023-09-05 RX ADMIN — ROPIVACAINE HYDROCHLORIDE 30 ML: 5 INJECTION, SOLUTION EPIDURAL; INFILTRATION; PERINEURAL at 09:09

## 2023-09-05 RX ADMIN — DEXAMETHASONE SODIUM PHOSPHATE 4 MG: 4 INJECTION INTRA-ARTICULAR; INTRALESIONAL; INTRAMUSCULAR; INTRAVENOUS; SOFT TISSUE at 09:37

## 2023-09-05 RX ADMIN — LORAZEPAM 0.5 MG: 2 INJECTION INTRAMUSCULAR; INTRAVENOUS at 12:34

## 2023-09-05 RX ADMIN — WATER 2000 MG: 1 INJECTION, SOLUTION INTRAMUSCULAR; INTRAVENOUS; SUBCUTANEOUS at 09:37

## 2023-09-05 RX ADMIN — ROPIVACAINE HYDROCHLORIDE 30 ML: 5 INJECTION EPIDURAL; INFILTRATION; PERINEURAL at 09:01

## 2023-09-05 RX ADMIN — FENTANYL CITRATE 100 MCG: 50 INJECTION INTRAMUSCULAR; INTRAVENOUS at 09:02

## 2023-09-05 RX ADMIN — FENTANYL CITRATE 25 MCG: 50 INJECTION INTRAMUSCULAR; INTRAVENOUS at 13:14

## 2023-09-05 RX ADMIN — Medication 10 MG: at 09:55

## 2023-09-05 RX ADMIN — OXYCODONE HYDROCHLORIDE AND ACETAMINOPHEN 1 TABLET: 5; 325 TABLET ORAL at 14:56

## 2023-09-05 RX ADMIN — PROPOFOL 50 MG: 10 INJECTION, EMULSION INTRAVENOUS at 09:48

## 2023-09-05 RX ADMIN — Medication 25 MG: at 10:11

## 2023-09-05 RX ADMIN — PROPOFOL 250 MG: 10 INJECTION, EMULSION INTRAVENOUS at 09:26

## 2023-09-05 RX ADMIN — FENTANYL CITRATE 25 MCG: 50 INJECTION INTRAMUSCULAR; INTRAVENOUS at 10:11

## 2023-09-05 RX ADMIN — TRANEXAMIC ACID 1000 MG: 100 INJECTION, SOLUTION INTRAVENOUS at 09:34

## 2023-09-05 RX ADMIN — PROPOFOL 50 MG: 10 INJECTION, EMULSION INTRAVENOUS at 09:50

## 2023-09-05 RX ADMIN — KETOROLAC TROMETHAMINE 15 MG: 15 INJECTION, SOLUTION INTRAMUSCULAR; INTRAVENOUS at 10:52

## 2023-09-05 RX ADMIN — ONDANSETRON 4 MG: 2 INJECTION INTRAMUSCULAR; INTRAVENOUS at 11:50

## 2023-09-05 RX ADMIN — Medication 10 MG: at 10:39

## 2023-09-05 RX ADMIN — SODIUM CHLORIDE, SODIUM LACTATE, POTASSIUM CHLORIDE, AND CALCIUM CHLORIDE: 600; 310; 30; 20 INJECTION, SOLUTION INTRAVENOUS at 08:43

## 2023-09-05 RX ADMIN — Medication 10 MG: at 10:05

## 2023-09-05 RX ADMIN — FENTANYL CITRATE 50 MCG: 50 INJECTION INTRAMUSCULAR; INTRAVENOUS at 09:50

## 2023-09-05 RX ADMIN — HYDROMORPHONE HYDROCHLORIDE 0.5 MG: 1 INJECTION, SOLUTION INTRAMUSCULAR; INTRAVENOUS; SUBCUTANEOUS at 12:36

## 2023-09-05 ASSESSMENT — PAIN DESCRIPTION - DESCRIPTORS
DESCRIPTORS: THROBBING;SHARP
DESCRIPTORS: THROBBING;DULL;SHARP
DESCRIPTORS: DULL;THROBBING
DESCRIPTORS: SHARP;THROBBING
DESCRIPTORS: DULL;ACHING;THROBBING
DESCRIPTORS: THROBBING;SHARP
DESCRIPTORS: SHARP;THROBBING

## 2023-09-05 ASSESSMENT — PAIN DESCRIPTION - ORIENTATION
ORIENTATION: RIGHT

## 2023-09-05 ASSESSMENT — PAIN DESCRIPTION - PAIN TYPE
TYPE: SURGICAL PAIN

## 2023-09-05 ASSESSMENT — PAIN SCALES - GENERAL
PAINLEVEL_OUTOF10: 7
PAINLEVEL_OUTOF10: 7
PAINLEVEL_OUTOF10: 6
PAINLEVEL_OUTOF10: 9
PAINLEVEL_OUTOF10: 6
PAINLEVEL_OUTOF10: 8

## 2023-09-05 ASSESSMENT — PAIN DESCRIPTION - LOCATION
LOCATION: HAND

## 2023-09-05 ASSESSMENT — PAIN - FUNCTIONAL ASSESSMENT
PAIN_FUNCTIONAL_ASSESSMENT: 0-10
PAIN_FUNCTIONAL_ASSESSMENT: ACTIVITIES ARE NOT PREVENTED

## 2023-09-05 NOTE — ANESTHESIA POSTPROCEDURE EVALUATION
Department of Anesthesiology  Postprocedure Note    Patient: Alicja Guillen  MRN: [id number]  YOB: 1965  Date of evaluation: 9/5/2023      Procedure Summary     Date: 09/05/23 Room / Location: [facility] MAIN 02 / [facility] MAIN OR    Anesthesia Start: 9555 Anesthesia Stop: 5864    Procedure: RIGHT THUMB TRAPEZIECTOMY AND SUSPENSION ARTHROPLASTY; RIGHT CARPAL TUNNEL RELEASE; RIGHT TRIGGER THUMB RELEASE; MINI C-ARM; [[employer]]; SUPRACLAVICULAR BLOCK (Right: Wrist) Diagnosis:       Arthritis of carpometacarpal (CMC) joint of right thumb      Severe carpal tunnel syndrome, right      Trigger thumb of right hand      (Arthritis of carpometacarpal (CMC) joint of right thumb [M18.11])      (Severe carpal tunnel syndrome, right [G56.01])      (Trigger thumb of right hand [M65.311])    Surgeons: Jerson Duran DO Responsible Provider: Rosita Nj DO    Anesthesia Type: General ASA Status: 3          Anesthesia Type: General    Jsohua Phase I: Joshua Score: 10    Joshua Phase II: Joshua Score: 10      Anesthesia Post Evaluation    Patient location during evaluation: PACU  Patient participation: complete - patient participated  Level of consciousness: awake and alert  Airway patency: patent  Nausea & Vomiting: no nausea and no vomiting  Complications: no  Cardiovascular status: hemodynamically stable  Respiratory status: acceptable  Hydration status: stable  Multimodal analgesia pain management approach

## 2023-09-05 NOTE — OP NOTE
Operative Note      Patient: Catherine Martin  YOB: 1965  MRN: [id number]    Date of Procedure: 9/5/2023    Pre-Op Diagnosis Codes:     * Arthritis of carpometacarpal [place]) joint of right thumb [M18.11]     * Severe carpal tunnel syndrome, right [G56.01]     * Trigger thumb of right hand [M65.311]    Post-Op Diagnosis: Same       Procedure(s):  RIGHT THUMB TRAPEZIECTOMY AND SUSPENSION ARTHROPLASTY; RIGHT CARPAL TUNNEL RELEASE; RIGHT TRIGGER THUMB RELEASE; MINI C-ARM; [employer]]; SUPRACLAVICULAR BLOCK    Surgeon(s):  Joyce Mason DO    Assistant:   Surgical Assistant: Bhumika Curran    Anesthesia: General, block, and local    Estimated Blood Loss (mL): less than 50     Complications: None    Specimens:   * No specimens in log *    Implants:  Implant Name Type Inv. Item Serial No.  Lot No. LRB No. Used Action   KIT FIBERLOCK SUSPENSION IMPLANT - [id number]  KIT FIBERLOCK SUSPENSION IMPLANT  [employer] [id number] Right 1 Implanted   ANCHOR SUT L8.5MM DIA3. 5MM HND WRST FORKED TIP EYELET - [id number]  ANCHOR SUT L8.5MM DIA3. 5MM HND WRST FORKED TIP EYELET  [employer] [id number] Right 1 Implanted         Drains: * No LDAs found *    Findings: End-stage DJD at [facility] joint, flattened median nerve, and thickened A1 pulley at thumb. Detailed Description of Procedure: Indications for procedure been outlined in the perioperative documentation, most notably being not amenable to conservative treatment. Informed consent was obtained from the patient. The risks and benefits of the procedure were discussed with the patient.   They include but are not limited to neurovascular injury, tendon/ligamentous injury, blood loss, infection, adjacent joint arthritis, incomplete release of nerve, incomplete release of tendon, incomplete relief of symptoms, need for further surgery, hematoma, neuroma, seroma, chronic pain, chronic stiffness, complications from anesthesia including death,

## 2023-09-05 NOTE — PERIOP NOTE
Patient Sharon Hsieh has been informed that [facility] is not responsible for patient belongings per policy and the signed [facility] Patient Agreement document. Personal items should be sent home or checked in with security. Patient Sharon Hsieh selected the following action:                            [x]  Send personal items home with a family member or friend                                                 []  Check in personal items with security, excluding clothing                            []  Maintain personal items at the bedside, against recommendation                                 by [facility]                                   ** If patient Juliann Amaro chooses to maintain personal items at the bedside,                                      Complete the patient belongings inventory in the EMR.

## 2023-09-05 NOTE — H&P
Update History & Physical    The patient's History and Physical of August 31, 2023 was reviewed with the patient and I examined the patient. There was no change. The surgical site was confirmed by the patient and me. Plan: The risks, benefits, expected outcome, and alternative to the recommended procedure have been discussed with the patient. Patient understands and wants to proceed with the procedure.      Electronically signed by Noreen Phan DO on 9/5/2023 at 8:10 AM

## 2023-09-05 NOTE — ANESTHESIA PROCEDURE NOTES
Peripheral Block    Patient location during procedure: pre-op  Reason for block: post-op pain management  Start time: 9/5/2023 9:01 AM  End time: 9/5/2023 9:10 AM  Staffing  Performed: anesthesiologist   Anesthesiologist: Belinda Biswas DO  Preanesthetic Checklist  Completed: patient identified, IV checked, site marked, risks and benefits discussed, surgical/procedural consents, equipment checked, pre-op evaluation, timeout performed, anesthesia consent given, oxygen available, monitors applied/VS acknowledged, fire risk safety assessment completed and verbalized and blood product R/B/A discussed and consented  Peripheral Block   Patient position: sitting  Prep: ChloraPrep  Provider prep: mask and sterile gloves  Patient monitoring: cardiac monitor, continuous pulse ox, frequent blood pressure checks, IV access, oxygen and responsive to questions  Block type: Brachial plexus  Supraclavicular  Laterality: right  Injection technique: single-shot  Guidance: ultrasound guided    Needle   Needle type: insulated echogenic nerve stimulator needle   Needle gauge: 22 G  Needle localization: ultrasound guidance  Needle length: 5 cm  Assessment   Injection assessment: negative aspiration for heme, no paresthesia on injection, local visualized surrounding nerve on ultrasound and no intravascular symptoms  Paresthesia pain: none  Slow fractionated injection: yes  Hemodynamics: stable  Real-time US image taken/store: yes  Outcomes: patient tolerated procedure well    Medications Administered  ropivacaine (NAROPIN) injection 0.5% - Perineural   30 mL - 9/5/2023 9:01:00 AM

## 2023-09-05 NOTE — BRIEF OP NOTE
Brief Postoperative Note      Patient: Maday Macias  YOB: 1965  MRN: [id number]    Date of Procedure: 9/5/2023    Pre-Op Diagnosis Codes:     * Arthritis of carpometacarpal [place]) joint of right thumb [M18.11]     * Severe carpal tunnel syndrome, right [G56.01]     * Trigger thumb of right hand [M65.311]    Post-Op Diagnosis: Same       Procedure(s):  RIGHT THUMB TRAPEZIECTOMY AND SUSPENSION ARTHROPLASTY; RIGHT CARPAL TUNNEL RELEASE; RIGHT TRIGGER THUMB RELEASE; MINI C-ARM; [employer]]; SUPRACLAVICULAR BLOCK    Surgeon(s):  Abiodun Reynolds DO    Assistant:  Surgical Assistant: Basim Curran    Anesthesia: General    Estimated Blood Loss (mL): Minimal    Complications: None    Specimens:   * No specimens in log *    Implants:  Implant Name Type Inv. Item Serial No.  Lot No. LRB No. Used Action   KIT FIBERLOCK SUSPENSION IMPLANT - [id number]  KIT FIBERLOCK SUSPENSION IMPLANT  [employer] [id number] Right 1 Implanted   ANCHOR SUT L8.5MM DIA3. 5MM HND WRST FORKED TIP EYELET - [id number]  ANCHOR SUT L8.5MM DIA3. 5MM HND WRST FORKED TIP EYELET  [employer] [id number] Right 1 Implanted         Drains: * No LDAs found *    Findings: flattened median nerve, end stage DJD cmc with loose bodies, and inflamed a1 pulley      Electronically signed by Abiodun Reynolds DO on 9/5/2023 at 10:57 AM

## 2023-09-12 ENCOUNTER — TELEPHONE (OUTPATIENT)
Age: 58
End: 2023-09-12

## 2023-09-12 DIAGNOSIS — Z98.890 H/O ARTHROPLASTY: ICD-10-CM

## 2023-09-12 DIAGNOSIS — M18.11 ARTHRITIS OF CARPOMETACARPAL (CMC) JOINT OF RIGHT THUMB: Primary | ICD-10-CM

## 2023-09-12 DIAGNOSIS — G56.01 SEVERE CARPAL TUNNEL SYNDROME, RIGHT: ICD-10-CM

## 2023-09-12 NOTE — TELEPHONE ENCOUNTER
Post op patient called for Rocky Giang. Patient said that Rocky Giang referred her to [facility] for Occupational Therapy for her Right Hand. Patient said that [facility] is to far from home. Patient is asking that the [place] for Occupational [place] [facility] [place]   Tel. [phone]  Fax [phone]. Patient tel. [phone].

## 2023-09-18 ENCOUNTER — OFFICE VISIT (OUTPATIENT)
Age: 58
End: 2023-09-18

## 2023-09-18 VITALS — BODY MASS INDEX: 37.49 KG/M2 | HEIGHT: 65 IN | WEIGHT: 225 LBS

## 2023-09-18 DIAGNOSIS — G56.01 SEVERE CARPAL TUNNEL SYNDROME, RIGHT: ICD-10-CM

## 2023-09-18 DIAGNOSIS — Z98.890 S/P TRIGGER FINGER RELEASE: ICD-10-CM

## 2023-09-18 DIAGNOSIS — M18.11 ARTHRITIS OF CARPOMETACARPAL (CMC) JOINT OF RIGHT THUMB: Primary | ICD-10-CM

## 2023-09-18 DIAGNOSIS — M65.311 TRIGGER THUMB OF RIGHT HAND: ICD-10-CM

## 2023-09-18 DIAGNOSIS — Z98.890 S/P CARPAL TUNNEL RELEASE: ICD-10-CM

## 2023-09-18 DIAGNOSIS — Z98.890 H/O ARTHROPLASTY: ICD-10-CM

## 2023-09-18 PROCEDURE — 99024 POSTOP FOLLOW-UP VISIT: CPT | Performed by: ORTHOPAEDIC SURGERY

## 2023-09-18 RX ORDER — HYDROCODONE BITARTRATE AND ACETAMINOPHEN 5; 325 MG/1; MG/1
1 TABLET ORAL EVERY 6 HOURS PRN
Qty: 20 TABLET | Refills: 0 | Status: SHIPPED | OUTPATIENT
Start: 2023-09-18 | End: 2023-09-23

## 2023-09-18 NOTE — PROGRESS NOTES
Nathalie Matta is a 59 y.o. female right handed retiree. Worker's Compensation and legal considerations: none    Chief Complaint   Patient presents with    Post-Op Check     Right carpal tunnel release  Right trigger thumb release  Right thumb CMC trapeziectomy with suspension arthroplasty     Pain Score:   7    HPI: Patient presents today 2 weeks status post right thumb trapeziectomy and suspension arthroplasty, right carpal tunnel release, and right trigger thumb release. She does report some soreness. She is scheduled for therapy on Wednesday. 8/31/2023 HPI: Patient presents today for preoperative history and physical as she is scheduled for right thumb trapeziectomy and suspension arthroplasty, right trigger thumb release, and right carpal tunnel release. Initial HPI: Patient presents today with complaints of bilateral thumb base pain. She also reports a history of bilateral hand numbness and tingling. She has recently had EMGs positive for moderate to severe carpal tunnel syndrome. Date of onset:  chronic  Injury: No  Prior Treatment:  right thumb trapeziectomy and suspension arthroplasty, right carpal tunnel release, and right trigger thumb release.     ROS: Review of Systems - General ROS: negative except HPI    Past Medical History:   Diagnosis Date    Depression     High cholesterol     Hypertension before 2012    OA (osteoarthritis)     Sleep apnea     mild- no machine       Past Surgical History:   Procedure Laterality Date    BREAST REDUCTION SURGERY Bilateral 1998    COLONOSCOPY      FACIAL COSMETIC SURGERY  2009    [address] with liposuction    HAND ARTHROPLASTY Right 9/5/2023    RIGHT THUMB TRAPEZIECTOMY AND SUSPENSION ARTHROPLASTY; RIGHT CARPAL TUNNEL RELEASE; RIGHT TRIGGER THUMB RELEASE; MINI C-ARM; [employer] [facility] [facility]]; SUPRACLAVICULAR BLOCK performed by Dharmesh Knutson DO at [address]  2009    TOTAL HIP ARTHROPLASTY Right 04/27/2022        Current Outpatient

## 2023-09-22 ENCOUNTER — TELEPHONE (OUTPATIENT)
Age: 58
End: 2023-09-22

## 2023-09-22 NOTE — TELEPHONE ENCOUNTER
Forearm based, whenever out of the house or doing work around the house. Otherwise may take off at home.

## 2023-09-22 NOTE — TELEPHONE ENCOUNTER
Custom splint would be good. A progressive ROM program for 4 weeks followed by strengthening. Otherwise no restrictions than hold off on strengthening for the first 4 weeks.

## 2023-09-22 NOTE — TELEPHONE ENCOUNTER
Chencho Rosales from [facility] called and had questions regarding the protocol after surgery for the [facility] arthoplasty. She asked are there any specific restrictions and will she need a custom splint because she's wearing her husbands old splint. She did state that she could make the patient a splint if needed. Please contact Chencho Rosales back at [phone].

## 2023-11-30 ENCOUNTER — OFFICE VISIT (OUTPATIENT)
Age: 58
End: 2023-11-30

## 2023-11-30 VITALS — TEMPERATURE: 96 F | BODY MASS INDEX: 38.44 KG/M2 | WEIGHT: 231 LBS

## 2023-11-30 DIAGNOSIS — Z98.890 S/P CARPAL TUNNEL RELEASE: ICD-10-CM

## 2023-11-30 DIAGNOSIS — Z98.890 H/O ARTHROPLASTY: ICD-10-CM

## 2023-11-30 DIAGNOSIS — Z98.890 S/P TRIGGER FINGER RELEASE: ICD-10-CM

## 2023-11-30 DIAGNOSIS — M65.311 TRIGGER THUMB OF RIGHT HAND: ICD-10-CM

## 2023-11-30 DIAGNOSIS — G56.01 SEVERE CARPAL TUNNEL SYNDROME, RIGHT: ICD-10-CM

## 2023-11-30 DIAGNOSIS — M18.11 ARTHRITIS OF CARPOMETACARPAL (CMC) JOINT OF RIGHT THUMB: Primary | ICD-10-CM

## 2023-11-30 PROCEDURE — 99024 POSTOP FOLLOW-UP VISIT: CPT

## 2023-11-30 NOTE — PROGRESS NOTES
Qiana Dalton is a 59 y.o. female right handed retiree. Worker's Compensation and legal considerations: none    Chief Complaint   Patient presents with    Hand Pain     Right Thumb    Post-Op Check     DOS 9/5/23     Pain Score:   2    11/30/2023 HPI: Patient presents today almost 3 months status post right thumb trapeziectomy and suspension arthroplasty, right carpal tunnel release, and right trigger thumb release. She has been going to therapy at [facility] in [facility]. She still notes some weakness of the hand, soreness of thumb, and intermittent swelling. She denies any further thumb locking. Patient concerned because her therapist said she had \"collapsing\" of the thumb [facility] DrNandini    9/7/2023 HPI: Patient presents today 2 weeks status post right thumb trapeziectomy and suspension arthroplasty, right carpal tunnel release, and right trigger thumb release. She does report some soreness. She is scheduled for therapy on Wednesday. Initial HPI: Patient presents today with complaints of bilateral thumb base pain. She also reports a history of bilateral hand numbness and tingling. She has recently had EMGs positive for moderate to severe carpal tunnel syndrome. Date of onset:  chronic  Injury: No  Prior Treatment:  right thumb trapeziectomy and suspension arthroplasty, right carpal tunnel release, and right trigger thumb release. ROS: Review of Systems - General ROS: negative except HPI    Past Medical History:   Diagnosis Date    Depression     High cholesterol     Hypertension before 2012    OA (osteoarthritis)     Sleep apnea     mild- no machine       Past Surgical History:   Procedure Laterality Date    BREAST REDUCTION SURGERY Bilateral 1998    COLONOSCOPY      FACIAL COSMETIC SURGERY  2009    [address] with liposuction    HAND ARTHROPLASTY Right 9/5/2023    RIGHT THUMB TRAPEZIECTOMY AND SUSPENSION ARTHROPLASTY; RIGHT CARPAL TUNNEL RELEASE; RIGHT TRIGGER THUMB RELEASE; [facility] C-ARM; [facility]];

## (undated) DEVICE — HOOD, PEEL-AWAY: Brand: FLYTE

## (undated) DEVICE — PAD N ADH W3XL4IN POLY COT SFT PERF FLM EASILY CUT ABSRB

## (undated) DEVICE — BANDAGE COMPR W1INXL5YD WHT COT E TAPE RUB BASE ADH CURAD

## (undated) DEVICE — SET EXTN SM 0.5ML L13IN BOR W/O INJ SITE

## (undated) DEVICE — DRAPE TWL SURG 16X26IN BLU ORB04] ALLCARE INC]

## (undated) DEVICE — ADHESIVE SKIN CLSR 0.7ML TOP DERMBND ADV

## (undated) DEVICE — SYR 10ML CTRL LR LCK NSAF LF --

## (undated) DEVICE — HANDPIECE SET WITH HIGH FLOW TIP AND SUCTION TUBE: Brand: INTERPULSE

## (undated) DEVICE — SPONGE GZ W4XL4IN COT 12 PLY TYP VII WVN C FLD DSGN

## (undated) DEVICE — GLOVE SURG SZ 8 L12IN FNGR THK79MIL GRN LTX FREE

## (undated) DEVICE — Device

## (undated) DEVICE — TRIDENT II TRITANIUM CLUSTER 54E
Type: IMPLANTABLE DEVICE | Site: HIP | Status: NON-FUNCTIONAL
Brand: TRIDENT II
Removed: 2022-04-27

## (undated) DEVICE — GLOVE SURG SZ 75 L12IN FNGR THK79MIL GRN LTX FREE

## (undated) DEVICE — MIRAGE SWIFT II PILLOW LGE: Brand: MIRAGE SWIFT II

## (undated) DEVICE — TRAY SUPP STD NO DRUG W EXTENSION SET

## (undated) DEVICE — TOWEL,OR,DSP,ST,BLUE,STD,4/PK,20PK/CS: Brand: MEDLINE

## (undated) DEVICE — SUTURE MCRYL SZ 3-0 L27IN ABSRB UD L26MM SH 1/2 CIR Y416H

## (undated) DEVICE — 3M™ STERI-DRAPE™ U-DRAPE 1015: Brand: STERI-DRAPE™

## (undated) DEVICE — DRAPE,HAND,STERILE: Brand: MEDLINE

## (undated) DEVICE — BANDAGE,ELASTIC,ESMARK,STERILE,4"X9',LF: Brand: MEDLINE

## (undated) DEVICE — SUTURE VCRL SZ 2-0 L18IN ABSRB UD CT-1 L36MM 1/2 CIR J839D

## (undated) DEVICE — SUT ETHLN 3-0 18IN PS1 BLK --

## (undated) DEVICE — CORD ES L12FT BPLR FRCP

## (undated) DEVICE — BLADE OPHTH GRN ROUNDED TIP 1 SIDE SHRP GRINDLESS MINI-BLDE

## (undated) DEVICE — GLOVE SURG SZ 8 CRM LTX FREE POLYISOPRENE POLYMER BEAD ANTI

## (undated) DEVICE — CURITY NON-ADHERENT STRIPS: Brand: CURITY

## (undated) DEVICE — SOL IRR SOD CL 0.9% 3000ML BG --

## (undated) DEVICE — SUTURE VCRL + SZ 2-0 L18IN ABSRB VLT CT-2 1/2 CIR TAPERCUT VCP726D

## (undated) DEVICE — MAT ANTISLIP W16INXL10YD BLU BULK RL DYCEM

## (undated) DEVICE — PAD,ABDOMINAL,5"X9",ST,LF,25/BX: Brand: MEDLINE INDUSTRIES, INC.

## (undated) DEVICE — (D)BNDG ADHESIVE FABRIC 3/4X3 -- DISC BY MFR USE ITEM 357960

## (undated) DEVICE — C-ARMOR C-ARM EQUIPMENT COVERS CLEAR STERILE UNIVERSAL FIT 12 PER CASE: Brand: C-ARMOR

## (undated) DEVICE — TOOL TRAPEZIECTOMY CRPL W HNDL FOR BNE EXCISION

## (undated) DEVICE — DRAPE,REIN 53X77,STERILE: Brand: MEDLINE

## (undated) DEVICE — APPLICATOR MEDICATED 26 CC SOLUTION HI LT ORNG CHLORAPREP

## (undated) DEVICE — KIT CATH 16FR 5ML SIL URIN INDWL BLLN STR TIP INF CTRL W/

## (undated) DEVICE — TRIDENT II TRITANIUM CLUSTER 52E
Type: IMPLANTABLE DEVICE | Site: HIP | Status: NON-FUNCTIONAL
Brand: TRIDENT II
Removed: 2022-04-27

## (undated) DEVICE — PEEL-AWAY TOGA, X-LARGE: Brand: FLYTE

## (undated) DEVICE — SUTURE MCRYL SZ 4-0 L18IN ABSRB UD L19MM PS-2 3/8 CIR PRIM Y496G

## (undated) DEVICE — SUTURE CHROMIC GUT SZ 4-0 L18IN ABSRB BRN L19MM PS-2 3/8 1637G

## (undated) DEVICE — PACK PROCEDURE SURG TOT HIP CUST

## (undated) DEVICE — GLOVE ORANGE PI 7 1/2   MSG9075

## (undated) DEVICE — DRESSING,GAUZE,XEROFORM,CURAD,1"X8",ST: Brand: CURAD

## (undated) DEVICE — CUFF BLD PRESSURE MONITORING LNG AD 23-33 CM 1 TUBE MY CUF

## (undated) DEVICE — HEWSON SUTURE RETRIEVER: Brand: HEWSON SUTURE RETRIEVER

## (undated) DEVICE — PACK PROCEDURE SURG MAJ W/ BASIN LF

## (undated) DEVICE — CANNULA PERF L2IN BLNT TIP 2MM VES CLR RADPQ BODY FEM LUER

## (undated) DEVICE — GOWN,SIRUS,FABRNF,2XL,18/CS: Brand: MEDLINE

## (undated) DEVICE — SUTURE MCRYL SZ 3-0 L27IN ABSRB UD L19MM PS-2 3/8 CIR PRIM Y427H

## (undated) DEVICE — 3M™ IOBAN™ 2 ANTIMICROBIAL INCISE DRAPE 6650EZ: Brand: IOBAN™ 2

## (undated) DEVICE — INTENT TO BE USED WITH SUTURE MATERIAL FOR TISSUE CLOSURE: Brand: RICHARD-ALLAN® NEEDLE 1/2 CIRCLE TAPER

## (undated) DEVICE — MAYO STAND COVER: Brand: CONVERTORS

## (undated) DEVICE — GARMENT,MEDLINE,DVT,INT,CALF,MED, GEN2: Brand: MEDLINE

## (undated) DEVICE — SUTURE VCRL + SZ 0 L18IN ABSRB UD L36MM CT-1 1/2 CIR VCP840D

## (undated) DEVICE — SYRINGE MED 30ML STD CLR PLAS LUERLOCK TIP N CTRL DISP

## (undated) DEVICE — KIT OR TURNOVER

## (undated) DEVICE — SUTURE FIBERWIRE SZ 2 W/ TAPERED NEEDLE BLUE L38IN NONABSORB BLU L26.5MM 1/2 CIRCLE AR7200

## (undated) DEVICE — LINER- CEMENTLESS
Type: IMPLANTABLE DEVICE | Site: HIP | Status: NON-FUNCTIONAL
Brand: MDM
Removed: 2022-04-27

## (undated) DEVICE — BLADE SAW 1.19X20X90 MM FOR LG BNE